# Patient Record
Sex: MALE | Race: WHITE | NOT HISPANIC OR LATINO | URBAN - METROPOLITAN AREA
[De-identification: names, ages, dates, MRNs, and addresses within clinical notes are randomized per-mention and may not be internally consistent; named-entity substitution may affect disease eponyms.]

---

## 2018-07-04 ENCOUNTER — EMERGENCY (EMERGENCY)
Facility: HOSPITAL | Age: 73
LOS: 0 days | Discharge: HOME | End: 2018-07-05
Attending: EMERGENCY MEDICINE | Admitting: EMERGENCY MEDICINE

## 2018-07-04 VITALS
TEMPERATURE: 97 F | HEART RATE: 88 BPM | RESPIRATION RATE: 22 BRPM | OXYGEN SATURATION: 93 % | WEIGHT: 179.9 LBS | HEIGHT: 66 IN

## 2018-07-04 DIAGNOSIS — I50.9 HEART FAILURE, UNSPECIFIED: ICD-10-CM

## 2018-07-04 DIAGNOSIS — Z79.02 LONG TERM (CURRENT) USE OF ANTITHROMBOTICS/ANTIPLATELETS: ICD-10-CM

## 2018-07-04 DIAGNOSIS — J44.9 CHRONIC OBSTRUCTIVE PULMONARY DISEASE, UNSPECIFIED: ICD-10-CM

## 2018-07-04 DIAGNOSIS — Z79.82 LONG TERM (CURRENT) USE OF ASPIRIN: ICD-10-CM

## 2018-07-04 DIAGNOSIS — R06.02 SHORTNESS OF BREATH: ICD-10-CM

## 2018-07-04 DIAGNOSIS — Z79.52 LONG TERM (CURRENT) USE OF SYSTEMIC STEROIDS: ICD-10-CM

## 2018-07-04 DIAGNOSIS — E78.00 PURE HYPERCHOLESTEROLEMIA, UNSPECIFIED: ICD-10-CM

## 2018-07-04 DIAGNOSIS — J42 UNSPECIFIED CHRONIC BRONCHITIS: ICD-10-CM

## 2018-07-04 DIAGNOSIS — Z79.899 OTHER LONG TERM (CURRENT) DRUG THERAPY: ICD-10-CM

## 2018-07-04 DIAGNOSIS — Z85.51 PERSONAL HISTORY OF MALIGNANT NEOPLASM OF BLADDER: ICD-10-CM

## 2018-07-04 DIAGNOSIS — R09.3 ABNORMAL SPUTUM: ICD-10-CM

## 2018-07-04 DIAGNOSIS — I11.0 HYPERTENSIVE HEART DISEASE WITH HEART FAILURE: ICD-10-CM

## 2018-07-04 LAB
BASE EXCESS BLDV CALC-SCNC: 5.4 MMOL/L — HIGH (ref -2–2)
BASOPHILS # BLD AUTO: 0.05 K/UL — SIGNIFICANT CHANGE UP (ref 0–0.2)
BASOPHILS NFR BLD AUTO: 0.3 % — SIGNIFICANT CHANGE UP (ref 0–1)
CA-I SERPL-SCNC: 1.24 MMOL/L — SIGNIFICANT CHANGE UP (ref 1.12–1.3)
EOSINOPHIL # BLD AUTO: 0.11 K/UL — SIGNIFICANT CHANGE UP (ref 0–0.7)
EOSINOPHIL NFR BLD AUTO: 0.6 % — SIGNIFICANT CHANGE UP (ref 0–8)
GAS PNL BLDV: 137 MMOL/L — SIGNIFICANT CHANGE UP (ref 136–145)
GAS PNL BLDV: SIGNIFICANT CHANGE UP
HCO3 BLDV-SCNC: 34 MMOL/L — HIGH (ref 22–29)
HCT VFR BLD CALC: 42.9 % — SIGNIFICANT CHANGE UP (ref 42–52)
HCT VFR BLDA CALC: 48.3 % — HIGH (ref 34–44)
HGB BLD CALC-MCNC: 15.8 G/DL — SIGNIFICANT CHANGE UP (ref 14–18)
HGB BLD-MCNC: 14.2 G/DL — SIGNIFICANT CHANGE UP (ref 14–18)
HOROWITZ INDEX BLDV+IHG-RTO: 21 — SIGNIFICANT CHANGE UP
IMM GRANULOCYTES NFR BLD AUTO: 1.7 % — HIGH (ref 0.1–0.3)
LACTATE BLDV-MCNC: 0.5 MMOL/L — SIGNIFICANT CHANGE UP (ref 0.5–1.6)
LACTATE SERPL-SCNC: 0.6 MMOL/L — SIGNIFICANT CHANGE UP (ref 0.5–2.2)
LYMPHOCYTES # BLD AUTO: 17.4 % — LOW (ref 20.5–51.1)
LYMPHOCYTES # BLD AUTO: 3.03 K/UL — SIGNIFICANT CHANGE UP (ref 1.2–3.4)
MCHC RBC-ENTMCNC: 31 PG — SIGNIFICANT CHANGE UP (ref 27–31)
MCHC RBC-ENTMCNC: 33.1 G/DL — SIGNIFICANT CHANGE UP (ref 32–37)
MCV RBC AUTO: 93.7 FL — SIGNIFICANT CHANGE UP (ref 80–94)
MONOCYTES # BLD AUTO: 1.16 K/UL — HIGH (ref 0.1–0.6)
MONOCYTES NFR BLD AUTO: 6.7 % — SIGNIFICANT CHANGE UP (ref 1.7–9.3)
NEUTROPHILS # BLD AUTO: 12.77 K/UL — HIGH (ref 1.4–6.5)
NEUTROPHILS NFR BLD AUTO: 73.3 % — SIGNIFICANT CHANGE UP (ref 42.2–75.2)
NRBC # BLD: 0 /100 WBCS — SIGNIFICANT CHANGE UP (ref 0–0)
PCO2 BLDV: 62 MMHG — HIGH (ref 41–51)
PH BLDV: 7.34 — SIGNIFICANT CHANGE UP (ref 7.26–7.43)
PLATELET # BLD AUTO: 241 K/UL — SIGNIFICANT CHANGE UP (ref 130–400)
PO2 BLDV: 18 MMHG — LOW (ref 20–40)
POTASSIUM BLDV-SCNC: 3.8 MMOL/L — SIGNIFICANT CHANGE UP (ref 3.3–5.6)
RBC # BLD: 4.58 M/UL — LOW (ref 4.7–6.1)
RBC # FLD: 13.2 % — SIGNIFICANT CHANGE UP (ref 11.5–14.5)
SAO2 % BLDV: 20 % — SIGNIFICANT CHANGE UP
WBC # BLD: 17.41 K/UL — HIGH (ref 4.8–10.8)
WBC # FLD AUTO: 17.41 K/UL — HIGH (ref 4.8–10.8)

## 2018-07-04 RX ORDER — TAMSULOSIN HYDROCHLORIDE 0.4 MG/1
1 CAPSULE ORAL
Qty: 0 | Refills: 0 | COMMUNITY

## 2018-07-04 RX ORDER — MAGNESIUM SULFATE 500 MG/ML
2 VIAL (ML) INJECTION ONCE
Qty: 0 | Refills: 0 | Status: COMPLETED | OUTPATIENT
Start: 2018-07-04 | End: 2018-07-04

## 2018-07-04 RX ORDER — IPRATROPIUM/ALBUTEROL SULFATE 18-103MCG
3 AEROSOL WITH ADAPTER (GRAM) INHALATION ONCE
Qty: 0 | Refills: 0 | Status: COMPLETED | OUTPATIENT
Start: 2018-07-04 | End: 2018-07-04

## 2018-07-04 RX ORDER — ASPIRIN/CALCIUM CARB/MAGNESIUM 324 MG
0 TABLET ORAL
Qty: 0 | Refills: 0 | COMMUNITY

## 2018-07-04 RX ORDER — LEVOTHYROXINE SODIUM 125 MCG
1 TABLET ORAL
Qty: 0 | Refills: 0 | COMMUNITY

## 2018-07-04 RX ORDER — DEXAMETHASONE 0.5 MG/5ML
10 ELIXIR ORAL ONCE
Qty: 0 | Refills: 0 | Status: COMPLETED | OUTPATIENT
Start: 2018-07-04 | End: 2018-07-04

## 2018-07-04 RX ORDER — AMIODARONE HYDROCHLORIDE 400 MG/1
0 TABLET ORAL
Qty: 0 | Refills: 0 | COMMUNITY

## 2018-07-04 RX ORDER — ATORVASTATIN CALCIUM 80 MG/1
1 TABLET, FILM COATED ORAL
Qty: 0 | Refills: 0 | COMMUNITY

## 2018-07-04 RX ORDER — POTASSIUM CHLORIDE 20 MEQ
0 PACKET (EA) ORAL
Qty: 0 | Refills: 0 | COMMUNITY

## 2018-07-04 RX ORDER — FUROSEMIDE 40 MG
1 TABLET ORAL
Qty: 0 | Refills: 0 | COMMUNITY

## 2018-07-04 RX ORDER — CLOPIDOGREL BISULFATE 75 MG/1
0 TABLET, FILM COATED ORAL
Qty: 0 | Refills: 0 | COMMUNITY

## 2018-07-04 RX ORDER — METOPROLOL TARTRATE 50 MG
0 TABLET ORAL
Qty: 0 | Refills: 0 | COMMUNITY

## 2018-07-04 RX ADMIN — Medication 50 GRAM(S): at 23:26

## 2018-07-04 RX ADMIN — Medication 3 MILLILITER(S): at 23:27

## 2018-07-04 RX ADMIN — Medication 10 MILLIGRAM(S): at 23:26

## 2018-07-04 RX ADMIN — Medication 3 MILLILITER(S): at 23:41

## 2018-07-04 NOTE — ED PROVIDER NOTE - OBJECTIVE STATEMENT
73 y m pmh copd on home o2, afib pw cough. Cough with increasing sputum production for the past few weeks. Feels he is unable to get all the phlegm out of his chest. No alleviating or exacerbating factors. Started on prednisone and augmentin by Dr. Jose with no relief. Denies fever, chills, n/v, cp, abd pain. 73 y m pmh copd on home o2, afib pw cough. Cough with increasing sputum production for the past few weeks. Feels he is unable to get all the phlegm out of his chest. No alleviating or exacerbating factors. Has been needing home o2 more and getting sob more easily. Started on prednisone and augmentin by Dr. Jose last week with no relief. Denies fever, chills, n/v, cp, abd pain.

## 2018-07-04 NOTE — ED PROVIDER NOTE - NS ED ROS FT
Eyes:  No visual changes, eye pain or discharge.  ENMT:  No hearing changes, pain, discharge or infections. No neck pain or stiffness.  Cardiac:  SOB. No chest pain or edema.   Respiratory:  No cough or respiratory distress. No hemoptysis. No history of asthma or RAD.  GI:  No nausea, vomiting, diarrhea or abdominal pain.  :  No dysuria, frequency or burning.  MS:  No myalgia, muscle weakness, joint pain or back pain.  Neuro:  No headache or weakness.  No LOC.  Skin:  No skin rash.   Endocrine: No history of thyroid disease or diabetes.

## 2018-07-04 NOTE — ED PROVIDER NOTE - ATTENDING CONTRIBUTION TO CARE
Pt is a 74yo male with hx of Afib and COPD on 2.5L home O2 who comes in for 2 weeks of afebrile cough with production of nonbloody whitish sputum.  He saw Dr. Jose and was started on Prednisone and Augmentin but reports symptoms have not abated.  No acute worsening of symptoms but reports that he is tired of feeling like he has chest congestion and is unable to get all the phlegm out.  No CP or syncope.  No other complaints.    Exam: b/l wheezes, speaking in full sentences, no LE edema, no calf tenderness soft nontender abdomen, cap refill <2s, MMM, normal pharynx, no LAD  Imp: chronic bronchitis  Plan: nebs, steroid, Mg, XR chest, EKG, labs

## 2018-07-04 NOTE — ED ADULT NURSE NOTE - PMH
Bladder cancer    Congestive heart failure    COPD (chronic obstructive pulmonary disease)    High blood cholesterol    Hypertension

## 2018-07-04 NOTE — ED PROVIDER NOTE - PROGRESS NOTE DETAILS
Pt says sob and cough improved after medications. Instructed pt to continue taking medications as prescribed by Dr. Jose and call him tomorrow morning to make an appointment. Pt says sob and cough improved after medications. Instructed pt to continue taking medications as prescribed by Dr. Jose and call him tomorrow morning to make an appointment. Pt in no acute respiratory distress. Speaking in full sentences with no accessory muscle use or increased work of breathing.

## 2018-07-04 NOTE — ED PROVIDER NOTE - PHYSICAL EXAMINATION
CONSTITUTIONAL: Well-developed; well-nourished; in no acute distress.   SKIN: warm, dry  HEAD: Normocephalic; atraumatic.  EYES: normal sclera and conjunctiva   ENT: No nasal discharge; airway clear.  NECK: Supple; non tender.  CARD: S1, S2 normal; no murmurs, gallops, or rubs. Regular rate and rhythm.   RESP: BL wheezing. No accessory muscle use, nasal flaring.   ABD: soft ntnd  EXT: Normal ROM.  No clubbing, cyanosis or edema. No posterior calf ttp.   LYMPH: No acute cervical adenopathy.  NEURO: Alert, oriented, grossly unremarkable  PSYCH: Cooperative, appropriate.

## 2018-07-05 VITALS
SYSTOLIC BLOOD PRESSURE: 150 MMHG | TEMPERATURE: 98 F | HEART RATE: 84 BPM | RESPIRATION RATE: 20 BRPM | DIASTOLIC BLOOD PRESSURE: 67 MMHG | OXYGEN SATURATION: 95 %

## 2018-07-05 LAB
ANION GAP SERPL CALC-SCNC: 9 MMOL/L — SIGNIFICANT CHANGE UP (ref 7–14)
BUN SERPL-MCNC: 37 MG/DL — HIGH (ref 10–20)
CALCIUM SERPL-MCNC: 9.4 MG/DL — SIGNIFICANT CHANGE UP (ref 8.5–10.1)
CHLORIDE SERPL-SCNC: 100 MMOL/L — SIGNIFICANT CHANGE UP (ref 98–110)
CO2 SERPL-SCNC: 31 MMOL/L — SIGNIFICANT CHANGE UP (ref 17–32)
CREAT SERPL-MCNC: 1.5 MG/DL — SIGNIFICANT CHANGE UP (ref 0.7–1.5)
GLUCOSE SERPL-MCNC: 108 MG/DL — HIGH (ref 70–99)
MAGNESIUM SERPL-MCNC: 1.9 MG/DL — SIGNIFICANT CHANGE UP (ref 1.8–2.4)
POTASSIUM SERPL-MCNC: 4.2 MMOL/L — SIGNIFICANT CHANGE UP (ref 3.5–5)
POTASSIUM SERPL-SCNC: 4.2 MMOL/L — SIGNIFICANT CHANGE UP (ref 3.5–5)
SODIUM SERPL-SCNC: 140 MMOL/L — SIGNIFICANT CHANGE UP (ref 135–146)
TROPONIN T SERPL-MCNC: <0.01 NG/ML — SIGNIFICANT CHANGE UP

## 2018-08-10 ENCOUNTER — EMERGENCY (EMERGENCY)
Facility: HOSPITAL | Age: 73
LOS: 0 days | Discharge: HOME | End: 2018-08-10
Attending: EMERGENCY MEDICINE | Admitting: EMERGENCY MEDICINE

## 2018-08-10 VITALS
SYSTOLIC BLOOD PRESSURE: 152 MMHG | RESPIRATION RATE: 18 BRPM | OXYGEN SATURATION: 96 % | HEIGHT: 66 IN | HEART RATE: 61 BPM | WEIGHT: 179.9 LBS | DIASTOLIC BLOOD PRESSURE: 84 MMHG

## 2018-08-10 DIAGNOSIS — R51 HEADACHE: ICD-10-CM

## 2018-08-10 DIAGNOSIS — I11.0 HYPERTENSIVE HEART DISEASE WITH HEART FAILURE: ICD-10-CM

## 2018-08-10 DIAGNOSIS — M62.838 OTHER MUSCLE SPASM: ICD-10-CM

## 2018-08-10 DIAGNOSIS — F17.200 NICOTINE DEPENDENCE, UNSPECIFIED, UNCOMPLICATED: ICD-10-CM

## 2018-08-10 DIAGNOSIS — Z79.82 LONG TERM (CURRENT) USE OF ASPIRIN: ICD-10-CM
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
## 2018-08-10 DIAGNOSIS — J44.9 CHRONIC OBSTRUCTIVE PULMONARY DISEASE, UNSPECIFIED: ICD-10-CM

## 2018-08-10 DIAGNOSIS — Z79.52 LONG TERM (CURRENT) USE OF SYSTEMIC STEROIDS: ICD-10-CM

## 2018-08-10 DIAGNOSIS — I50.9 HEART FAILURE, UNSPECIFIED: ICD-10-CM
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
## 2018-08-10 RX ORDER — ACETAMINOPHEN 500 MG
650 TABLET ORAL ONCE
Qty: 0 | Refills: 0 | Status: COMPLETED | OUTPATIENT
Start: 2018-08-10 | End: 2018-08-10

## 2018-08-10 RX ORDER — IBUPROFEN 200 MG
600 TABLET ORAL ONCE
Qty: 0 | Refills: 0 | Status: COMPLETED | OUTPATIENT
Start: 2018-08-10 | End: 2018-08-10

## 2018-08-10 RX ORDER — IPRATROPIUM/ALBUTEROL SULFATE 18-103MCG
3 AEROSOL WITH ADAPTER (GRAM) INHALATION ONCE
Qty: 0 | Refills: 0 | Status: COMPLETED | OUTPATIENT
Start: 2018-08-10 | End: 2018-08-10

## 2018-08-10 RX ADMIN — Medication 3 MILLILITER(S): at 20:22

## 2018-08-10 RX ADMIN — Medication 600 MILLIGRAM(S): at 20:46
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
## 2018-08-10 NOTE — ED ADULT NURSE NOTE - CHPI ED NUR SYMPTOMS NEG
no decreased eating/drinking/no weakness/no tingling/no nausea/no chills/no dizziness/no vomiting/no fever

## 2018-08-10 NOTE — ED ADULT NURSE NOTE - NSIMPLEMENTINTERV_GEN_ALL_ED
Implemented All Universal Safety Interventions:  [place] to call system. Call bell, personal items and telephone within reach. Instruct patient to call for assistance. Room bathroom lighting operational. Non-slip footwear when patient is off stretcher. Physically safe environment: no spills, clutter or unnecessary equipment. Stretcher in lowest position, wheels locked, appropriate side rails in place.

## 2018-08-11 PROBLEM — I50.9 HEART FAILURE, UNSPECIFIED: Chronic | Status: ACTIVE | Noted: 2018-07-04

## 2018-08-11 PROBLEM — C67.9 MALIGNANT NEOPLASM OF BLADDER, UNSPECIFIED: Chronic | Status: ACTIVE | Noted: 2018-07-04

## 2018-08-11 PROBLEM — J44.9 CHRONIC OBSTRUCTIVE PULMONARY DISEASE, UNSPECIFIED: Chronic | Status: ACTIVE | Noted: 2018-07-04

## 2018-08-11 PROBLEM — E78.00 PURE HYPERCHOLESTEROLEMIA, UNSPECIFIED: Chronic | Status: ACTIVE | Noted: 2018-07-04

## 2018-08-11 PROBLEM — I10 ESSENTIAL (PRIMARY) HYPERTENSION: Chronic | Status: ACTIVE | Noted: 2018-07-04

## 2018-08-11 NOTE — ED PROVIDER NOTE - PHYSICAL EXAMINATION
CONST: Well appearing in NAD  EYES: PERRL, EOMI, Sclera and conjunctiva clear.   ENT: No nasal discharge. TM's clear B/L without drainage. Oropharynx normal appearing, no erythema or exudates. Uvula midline.  NECK: Non-tender, no meningeal signs  CARD: Normal S1 S2; Normal rate and rhythm  RESP: Equal BS B/L, No wheezes, rhonchi or rales. No distress  GI: Soft, non-tender, non-distended.  MS: + tenderness to bilateral trapezius, pulses 2 +, Normal ROM in all extremities. No midline spinal tenderness.  SKIN: Warm, dry, no acute rashes. Good turgor  NEURO: A&Ox3, No focal deficits. Strength 5/5 with no sensory deficits. Steady gait

## 2018-08-11 NOTE — ED PROVIDER NOTE - MEDICAL DECISION MAKING DETAILS
I personally evaluated the patient. I reviewed the Resident’s or Physician Assistant’s note (as assigned above), and agree with the findings and plan except as documented in my note. I have fully discussed the medical management and delivery of care with the patient. I have discussed any available labs, imaging and treatment options with the patient. Patient confirms understanding and has been given detailed return precautions. Patient instructed to return to the ED should symptoms persist or worsen. Patient has demonstrated capacity and has verbalized understanding. Patient is well appearing upon discharge. Repeat neuro exam prior to discharge unremarkable.
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
## 2018-08-11 NOTE — ED PROVIDER NOTE - NS ED ROS FT
Review of Systems:  	•	CONSTITUTIONAL - no fever, no diaphoresis, no chills  	•	SKIN - no rash  	•	EYES - no eye pain, no blurry vision  	•	ENT - no change in hearing, no sore throat, no ear pain or tinnitus  	•	RESPIRATORY - no shortness of breath, no cough  	•	CARDIAC - no chest pain, no palpitations  	•	GI - no abd pain, no nausea, no vomiting, no diarrhea, no constipation  	•	GENITO-URINARY - no discharge, no dysuria; no hematuria, no increased urinary frequency  	•	MUSCULOSKELETAL - no joint paint, no swelling, no redness  	•	NEUROLOGIC - HA

## 2021-03-15 ENCOUNTER — NON-APPOINTMENT (OUTPATIENT)
Age: 76
End: 2021-03-15

## 2021-03-15 DIAGNOSIS — Z87.891 PERSONAL HISTORY OF NICOTINE DEPENDENCE: ICD-10-CM

## 2021-03-15 RX ORDER — TIOTROPIUM BROMIDE 18 UG/1
CAPSULE ORAL; RESPIRATORY (INHALATION)
Refills: 0 | Status: ACTIVE | COMMUNITY

## 2021-03-15 RX ORDER — ASPIRIN 81 MG
81 TABLET, DELAYED RELEASE (ENTERIC COATED) ORAL
Refills: 0 | Status: ACTIVE | COMMUNITY

## 2021-03-15 RX ORDER — ALBUTEROL SULFATE 90 UG/1
108 (90 BASE) AEROSOL, METERED RESPIRATORY (INHALATION)
Refills: 0 | Status: ACTIVE | COMMUNITY

## 2021-03-25 ENCOUNTER — APPOINTMENT (OUTPATIENT)
Dept: PULMONOLOGY | Facility: CLINIC | Age: 76
End: 2021-03-25
Payer: MEDICARE

## 2021-03-25 VITALS
HEIGHT: 66 IN | HEART RATE: 62 BPM | DIASTOLIC BLOOD PRESSURE: 70 MMHG | SYSTOLIC BLOOD PRESSURE: 130 MMHG | RESPIRATION RATE: 12 BRPM | BODY MASS INDEX: 28.28 KG/M2 | WEIGHT: 176 LBS

## 2021-03-25 PROCEDURE — 99072 ADDL SUPL MATRL&STAF TM PHE: CPT

## 2021-03-25 PROCEDURE — 99213 OFFICE O/P EST LOW 20 MIN: CPT

## 2021-03-25 PROCEDURE — 71046 X-RAY EXAM CHEST 2 VIEWS: CPT

## 2021-03-25 PROCEDURE — 99406 BEHAV CHNG SMOKING 3-10 MIN: CPT

## 2021-03-25 NOTE — COUNSELING
[Risk of tobacco use and health benefits of smoking cessation discussed] : Risk of tobacco use and health benefits of smoking cessation discussed [Encouraged to pick a quit date and identify support needed to quit] : Encouraged to pick a quit date and identify support needed to quit [Tobacco Use Cessation Intermediate Greater Than 3 Minutes Up to 10 Minutes] : Tobacco Use Cessation Intermediate Greater Than 3 Minutes Up to 10 Minutes [FreeTextEntry1] : 5

## 2021-03-25 NOTE — PHYSICAL EXAM
[No Acute Distress] : no acute distress [Normal Oropharynx] : normal oropharynx [Normal Appearance] : normal appearance [No Neck Mass] : no neck mass [Normal Rate/Rhythm] : normal rate/rhythm [Normal S1, S2] : normal s1, s2 [No Murmurs] : no murmurs [No Resp Distress] : no resp distress [No Abnormalities] : no abnormalities [Benign] : benign [Normal Gait] : normal gait [No Clubbing] : no clubbing [No Cyanosis] : no cyanosis [No Edema] : no edema [FROM] : FROM [Normal Color/ Pigmentation] : normal color/ pigmentation [No Focal Deficits] : no focal deficits [Oriented x3] : oriented x3 [Normal Affect] : normal affect [TextBox_68] : decreased scattered ronchi

## 2021-03-25 NOTE — HISTORY OF PRESENT ILLNESS
[Stable] : stable [Difficulty Breathing During Exertion] : stable dyspnea on exertion [Feelings Of Weakness On Exertion] : stable exercise intolerance [Cough] : stable coughing [Coughing Up Sputum] : denies coughing up sputum [Wheezing] : denies wheezing [Regional Soft Tissue Swelling Both Lower Extremities] : denies lower extremity edema [Fever] : no fever [More Frequent Use Needed Recently] : more frequent [Adherent] : the patient is adherent with ~his/her~ medication regimen [FreeTextEntry9] : started smoking again. Due for CT but he is refusing Daughter present and aware. [de-identified] : taking Advair PRN

## 2021-03-25 NOTE — ASSESSMENT
[FreeTextEntry1] : Stress compliance with inhalers\par PRN albuterol\par ICS/LABA BID stressed importance\par needs CT but refusing\par F/U 6 months\par

## 2021-06-01 NOTE — ED ADULT NURSE NOTE - TOBACCO USE
----- Message from Ramesh Mason MD sent at 5/31/2021  8:59 PM CDT -----  Overall stable with mild increase in thickness observe   
Will discuss in clinic visit tomorrow.  
Current some day smoker

## 2021-09-24 ENCOUNTER — APPOINTMENT (OUTPATIENT)
Dept: CARDIOLOGY | Facility: CLINIC | Age: 76
End: 2021-09-24
Payer: MEDICARE

## 2021-09-24 VITALS
SYSTOLIC BLOOD PRESSURE: 126 MMHG | WEIGHT: 169 LBS | BODY MASS INDEX: 27.16 KG/M2 | HEIGHT: 66 IN | DIASTOLIC BLOOD PRESSURE: 68 MMHG

## 2021-09-24 PROCEDURE — 99214 OFFICE O/P EST MOD 30 MIN: CPT

## 2021-09-24 RX ORDER — FLUTICASONE FUROATE AND VILANTEROL TRIFENATATE 200; 25 UG/1; UG/1
200-25 POWDER RESPIRATORY (INHALATION)
Refills: 0 | Status: DISCONTINUED | COMMUNITY
End: 2021-09-24

## 2021-09-24 NOTE — DISCUSSION/SUMMARY
[FreeTextEntry1] : Patient lost 7 pond.  Bladder cancer s. Eating less. He sees Dr Jose,He use o2 24 hours, On amiodarone five days /week. week.  He has bladder ca, He sees  Franck. He got chemo at [facility] He had afib converted with amiodarone Now on po amiodarone. CXR by Dr Jose. Cysto every 6 mos. Opth patiet to see.Last dobutamine  se 4/18 neg. He had shoulder pain. But it is not cardiac. To try not Vape

## 2021-09-24 NOTE — REVIEW OF SYSTEMS
[Fever] : no fever [Chills] : no chills [Blurry Vision] : no blurred vision [Hearing Loss] : hearing loss [Dyspnea on exertion] : dyspnea during exertion [Cough] : cough [Wheezing] : wheezing [Abdominal Pain] : no abdominal pain [Joint Pain] : no joint pain [Rash] : no rash [Dizziness] : no dizziness [Confusion] : no confusion was observed [Easy Bleeding] : no tendency for easy bleeding

## 2021-09-24 NOTE — HISTORY OF PRESENT ILLNESS
[FreeTextEntry1] : The patient use o2 at home. He is sob. No change. He gets THAPA. One episode sharp pain l arm. No exertional pain It was like electric shock . He still smokes

## 2021-10-02 ENCOUNTER — EMERGENCY (EMERGENCY)
Facility: HOSPITAL | Age: 76
LOS: 0 days | Discharge: HOME | End: 2021-10-02
Attending: EMERGENCY MEDICINE | Admitting: EMERGENCY MEDICINE
Payer: MEDICARE

## 2021-10-02 VITALS
HEART RATE: 50 BPM | SYSTOLIC BLOOD PRESSURE: 141 MMHG | RESPIRATION RATE: 18 BRPM | OXYGEN SATURATION: 96 % | TEMPERATURE: 98 F | DIASTOLIC BLOOD PRESSURE: 72 MMHG

## 2021-10-02 VITALS
TEMPERATURE: 98 F | HEIGHT: 66 IN | RESPIRATION RATE: 18 BRPM | DIASTOLIC BLOOD PRESSURE: 56 MMHG | WEIGHT: 171.96 LBS | OXYGEN SATURATION: 99 % | HEART RATE: 54 BPM | SYSTOLIC BLOOD PRESSURE: 117 MMHG

## 2021-10-02 DIAGNOSIS — S01.20XA UNSPECIFIED OPEN WOUND OF NOSE, INITIAL ENCOUNTER: ICD-10-CM

## 2021-10-02 DIAGNOSIS — J44.9 CHRONIC OBSTRUCTIVE PULMONARY DISEASE, UNSPECIFIED: ICD-10-CM

## 2021-10-02 DIAGNOSIS — I50.9 HEART FAILURE, UNSPECIFIED: ICD-10-CM
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
## 2021-10-02 DIAGNOSIS — I11.0 HYPERTENSIVE HEART DISEASE WITH HEART FAILURE: ICD-10-CM

## 2021-10-02 DIAGNOSIS — E78.5 HYPERLIPIDEMIA, UNSPECIFIED: ICD-10-CM

## 2021-10-02 DIAGNOSIS — Z79.82 LONG TERM (CURRENT) USE OF ASPIRIN: ICD-10-CM

## 2021-10-02 DIAGNOSIS — R04.0 EPISTAXIS: ICD-10-CM
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
## 2021-10-02 DIAGNOSIS — I25.10 ATHEROSCLEROTIC HEART DISEASE OF NATIVE CORONARY ARTERY WITHOUT ANGINA PECTORIS: ICD-10-CM

## 2021-10-02 DIAGNOSIS — L76.22 POSTPROCEDURAL HEMORRHAGE OF SKIN AND SUBCUTANEOUS TISSUE FOLLOWING OTHER PROCEDURE: ICD-10-CM

## 2021-10-02 DIAGNOSIS — E78.00 PURE HYPERCHOLESTEROLEMIA, UNSPECIFIED: ICD-10-CM

## 2021-10-02 DIAGNOSIS — Z85.51 PERSONAL HISTORY OF MALIGNANT NEOPLASM OF BLADDER: ICD-10-CM

## 2021-10-02 PROCEDURE — 99284 EMERGENCY DEPT VISIT MOD MDM: CPT | Mod: 25

## 2021-10-02 PROCEDURE — 30901 CONTROL OF NOSEBLEED: CPT

## 2021-10-02 NOTE — ED PROVIDER NOTE - NSICDXPASTMEDICALHX_GEN_ALL_CORE_FT
PAST MEDICAL HISTORY:  Bladder cancer     Congestive heart failure     COPD (chronic obstructive pulmonary disease)     High blood cholesterol     Hypertension

## 2021-10-02 NOTE — ED PROVIDER NOTE - CLINICAL SUMMARY MEDICAL DECISION MAKING FREE TEXT BOX
Pt with bleeding from wound that required cautery.  Pt observed in ED.  no further bleeding.  Will f/u with his Derm. Pt instructed to return if any worsening symptoms or concerns.  They verbalize understanding.

## 2021-10-02 NOTE — ED PROVIDER NOTE - PHYSICAL EXAMINATION
Physical Exam    Vital Signs: I have reviewed the initial vital signs.  Constitutional: well-nourished, appears stated age, no acute distress  Eyes: Conjunctiva pink, Sclera clear,  ENT: from bx site, bleeding from numerous small vessels.   Cardiovascular: S1 and S2, regular rate, regular rhythm, well-perfused extremities, radial pulses equal and 2+  Respiratory: unlabored respiratory effort, clear to auscultation bilaterally no wheezing, rales and rhonchi  Gastrointestinal: soft, non-tender abdomen, no pulsatile mass, normal bowl sounds  Musculoskeletal: supple neck, no lower extremity edema, no midline tenderness  Integumentary: warm, dry, no rash  Neurologic: awake, alert, nvi

## 2021-10-02 NOTE — ED PROVIDER NOTE - OBJECTIVE STATEMENT
75 yo male, pmh of htn, hld, cad on asa and plavix, copd, presents to ed for bleeding from nose s/p bx several days ago, no pain or radiation, started today pta. denies falls.

## 2021-10-02 NOTE — ED PROVIDER NOTE - ATTENDING CONTRIBUTION TO CARE
77 yo M presents with bleeding from nose at biopsy site.  Pt had biopsy with Derm few days ago.  States bleeding started when he was cleaning his nose. no trauma, + biopsy site with bleeding from inf edge, no swelling, no pus

## 2021-10-02 NOTE — ED PROVIDER NOTE - NS ED ROS FT

## 2021-10-02 NOTE — ED PROVIDER NOTE - PATIENT PORTAL LINK FT
You can access the FollowMyHealth Patient Portal offered by [facility] by registering at the following website: [email or web address]. By joining [employer]’s FollowMyHealth portal, you will also be able to view your health information using other applications (apps) compatible with our system.

## 2021-10-02 NOTE — ED ADULT NURSE NOTE - NSFALLRSKASSESSTYPE_ED_ALL_ED
fall precautions Initial (On Arrival) isolation precautions/fall precautions/Airborne/contact; (+) Covid

## 2021-10-02 NOTE — ED ADULT TRIAGE NOTE - CHIEF COMPLAINT QUOTE
pt has biopsy on nose, today was cleaning would and it began to bleed excessively. bleeding stopped on arrival to ED, pt takes plavix and baby aspirin

## 2021-11-04 ENCOUNTER — APPOINTMENT (OUTPATIENT)
Age: 76
End: 2021-11-04

## 2021-11-29 ENCOUNTER — LABORATORY RESULT (OUTPATIENT)
Age: 76
End: 2021-11-29

## 2021-11-30 ENCOUNTER — NON-APPOINTMENT (OUTPATIENT)
Age: 76
End: 2021-11-30

## 2021-11-30 ENCOUNTER — APPOINTMENT (OUTPATIENT)
Dept: CARDIOLOGY | Facility: CLINIC | Age: 76
End: 2021-11-30
Payer: MEDICARE

## 2021-11-30 VITALS
HEIGHT: 66 IN | DIASTOLIC BLOOD PRESSURE: 70 MMHG | BODY MASS INDEX: 27.64 KG/M2 | WEIGHT: 172 LBS | SYSTOLIC BLOOD PRESSURE: 124 MMHG

## 2021-11-30 PROCEDURE — 99214 OFFICE O/P EST MOD 30 MIN: CPT

## 2021-11-30 NOTE — HISTORY OF PRESENT ILLNESS
[FreeTextEntry1] : The patient use o2 at home. He is sob. He gets THAPA. Possibly worse.  He still smokes. He has pvd. But limited by THAPA

## 2021-11-30 NOTE — PHYSICAL EXAM
[Well Developed] : well developed [Well Nourished] : well nourished [No Acute Distress] : no acute distress [Normal Conjunctiva] : normal conjunctiva [Normal Venous Pressure] : normal venous pressure [Normal S1, S2] : normal S1, S2 [No Murmur] : no murmur [Normal Rate] : the respiratory rate was normal [Decreased Breath Sounds] : breath sounds were decreased diffusely [Decreased Breath Sounds] : breath sounds were not diminished [Normal Gait] : normal gait [No Edema] : no edema [Alert and Oriented] : alert and oriented [de-identified] : varicose veins legs

## 2021-11-30 NOTE — DISCUSSION/SUMMARY
[FreeTextEntry1] : Patient gained 3  ponds.  Bladder cancer . He sees Dr Jose,He is suppose  use o2 24 hours, He does not always use.  On amiodarone five days /week. week.  He has bladder ca, He sees  Franck.Now see Dr Hayes.  He got chemo at [facility] He had afib converted with amiodarone Now on po amiodarone. CXR by Dr Jose. Cysto every 6 mos. Opth patiet to see.Last dobutamine  se 4/18 neg. He had shoulder pain. But it is not cardiac. Told stop smoking . will check blood

## 2021-12-01 LAB
25(OH)D3 SERPL-MCNC: 24 NG/ML
BASOPHILS # BLD AUTO: 0.1 K/UL
BASOPHILS NFR BLD AUTO: 0.9 %
CHOLEST SERPL-MCNC: 177 MG/DL
EOSINOPHIL # BLD AUTO: 0.42 K/UL
EOSINOPHIL NFR BLD AUTO: 3.8 %
HCT VFR BLD CALC: 47.8 %
HDLC SERPL-MCNC: 51 MG/DL
HGB BLD-MCNC: 15.1 G/DL
IMM GRANULOCYTES NFR BLD AUTO: 0.4 %
LDLC SERPL CALC-MCNC: 93 MG/DL
LYMPHOCYTES # BLD AUTO: 3.45 K/UL
LYMPHOCYTES NFR BLD AUTO: 31.6 %
MAN DIFF?: NORMAL
MCHC RBC-ENTMCNC: 29.7 PG
MCHC RBC-ENTMCNC: 31.6 G/DL
MCV RBC AUTO: 93.9 FL
MONOCYTES # BLD AUTO: 1.13 K/UL
MONOCYTES NFR BLD AUTO: 10.4 %
NEUTROPHILS # BLD AUTO: 5.77 K/UL
NEUTROPHILS NFR BLD AUTO: 52.9 %
NONHDLC SERPL-MCNC: 126 MG/DL
PLATELET # BLD AUTO: 222 K/UL
RBC # BLD: 5.09 M/UL
RBC # FLD: 13.8 %
T4 SERPL-MCNC: 5.8 UG/DL
TRIGL SERPL-MCNC: 189 MG/DL
TSH SERPL-ACNC: 11.41 UIU/ML
WBC # FLD AUTO: 10.91 K/UL

## 2021-12-01 RX ORDER — POTASSIUM CHLORIDE 750 MG/1
10 TABLET, EXTENDED RELEASE ORAL 3 TIMES DAILY
Qty: 270 | Refills: 3 | Status: DISCONTINUED | COMMUNITY
Start: 1900-01-01 | End: 2021-12-01

## 2021-12-01 RX ORDER — LEVOTHYROXINE SODIUM 100 UG/1
100 TABLET ORAL DAILY
Qty: 30 | Refills: 5 | Status: DISCONTINUED | COMMUNITY
Start: 2021-07-03 | End: 2021-12-01

## 2021-12-02 LAB
ALBUMIN SERPL ELPH-MCNC: 4.4 G/DL
ALP BLD-CCNC: 104 U/L
ALT SERPL-CCNC: 14 U/L
ANION GAP SERPL CALC-SCNC: 17 MMOL/L
AST SERPL-CCNC: 19 U/L
BILIRUB SERPL-MCNC: 0.8 MG/DL
BUN SERPL-MCNC: 26 MG/DL
CALCIUM SERPL-MCNC: 9.5 MG/DL
CHLORIDE SERPL-SCNC: 99 MMOL/L
CO2 SERPL-SCNC: 25 MMOL/L
CREAT SERPL-MCNC: 1.3 MG/DL
GLUCOSE SERPL-MCNC: 97 MG/DL
POTASSIUM SERPL-SCNC: 4.1 MMOL/L
PROT SERPL-MCNC: 7.5 G/DL
SODIUM SERPL-SCNC: 141 MMOL/L

## 2021-12-12 NOTE — ED ADULT NURSE NOTE - CINV DISCH TEACH PARTICIP
Thang Patel)  Orthopaedic Surgery  [address] [place] [address]  Phone: [phone]  Fax: [phone]  Follow Up Time: 1-3 Days   Patient

## 2021-12-13 RX ORDER — METOPROLOL SUCCINATE 25 MG/1
25 TABLET, EXTENDED RELEASE ORAL DAILY
Qty: 90 | Refills: 3 | Status: DISCONTINUED | COMMUNITY
Start: 1900-01-01 | End: 2021-12-13

## 2021-12-30 ENCOUNTER — APPOINTMENT (OUTPATIENT)
Dept: CARDIOLOGY | Facility: CLINIC | Age: 76
End: 2021-12-30
Payer: MEDICARE

## 2021-12-30 ENCOUNTER — APPOINTMENT (OUTPATIENT)
Dept: CARDIOLOGY | Facility: CLINIC | Age: 76
End: 2021-12-30

## 2021-12-30 PROCEDURE — 93000 ELECTROCARDIOGRAM COMPLETE: CPT

## 2022-01-06 ENCOUNTER — APPOINTMENT (OUTPATIENT)
Age: 77
End: 2022-01-06

## 2022-01-13 ENCOUNTER — APPOINTMENT (OUTPATIENT)
Age: 77
End: 2022-01-13

## 2022-03-24 ENCOUNTER — APPOINTMENT (OUTPATIENT)
Dept: CARDIOLOGY | Facility: CLINIC | Age: 77
End: 2022-03-24
Payer: MEDICARE

## 2022-03-24 VITALS
SYSTOLIC BLOOD PRESSURE: 120 MMHG | HEIGHT: 66 IN | BODY MASS INDEX: 26.52 KG/M2 | DIASTOLIC BLOOD PRESSURE: 70 MMHG | WEIGHT: 165 LBS

## 2022-03-24 PROCEDURE — 99214 OFFICE O/P EST MOD 30 MIN: CPT

## 2022-03-24 NOTE — DISCUSSION/SUMMARY
[FreeTextEntry1] : Patient gained 3  ponds.  Bladder cancer . He sees Dr Jose,He is suppose  use o2 24 hours, He does not always use.  On amiodarone five days /week. week.  He has bladder ca, He sees  Franck.Now see Dr Hayes.  He got chemo at [facility] He had afib converted with amiodarone Now on po amiodarone. CXR by Dr Jose. Cysto every 6 mos. Opth patiet to see.Last dobutamine  se 4/18 neg. He had shoulder pain. But it is not cardiac. Reviewed stop smoking . He had covid 12/21. Check blood thyroid vit d possibly next visit

## 2022-03-24 NOTE — PHYSICAL EXAM
[Well Developed] : well developed [Well Nourished] : well nourished [No Acute Distress] : no acute distress [Normal Conjunctiva] : normal conjunctiva [Normal Venous Pressure] : normal venous pressure [Normal S1, S2] : normal S1, S2 [No Murmur] : no murmur [Normal Rate] : the respiratory rate was normal [Decreased Breath Sounds] : breath sounds were decreased diffusely [Decreased Breath Sounds] : breath sounds were not diminished [Normal Gait] : normal gait [No Edema] : no edema [Alert and Oriented] : alert and oriented [de-identified] : varicose veins legs

## 2022-03-24 NOTE — HISTORY OF PRESENT ILLNESS
[FreeTextEntry1] : The patient use o2 at home. He is sob. He gets THAPA. stable. More anxious since covid.    He still smokes. He has pvd. But limited by THAPA. He had covid 12/21. Occosional palpitations

## 2022-03-25 ENCOUNTER — RX RENEWAL (OUTPATIENT)
Age: 77
End: 2022-03-25

## 2022-04-28 DIAGNOSIS — Z23 ENCOUNTER FOR IMMUNIZATION: ICD-10-CM

## 2022-07-07 ENCOUNTER — APPOINTMENT (OUTPATIENT)
Dept: CARDIOLOGY | Facility: CLINIC | Age: 77
End: 2022-07-07

## 2022-08-10 ENCOUNTER — APPOINTMENT (OUTPATIENT)
Age: 77
End: 2022-08-10

## 2022-08-10 VITALS
RESPIRATION RATE: 14 BRPM | OXYGEN SATURATION: 92 % | WEIGHT: 158 LBS | HEART RATE: 49 BPM | HEIGHT: 66 IN | BODY MASS INDEX: 25.39 KG/M2

## 2022-08-10 PROCEDURE — 99214 OFFICE O/P EST MOD 30 MIN: CPT

## 2022-08-11 NOTE — PROCEDURE
[FreeTextEntry1] : PA and LAT CXR Report\par \par PA and LAT CXR was ordered to evaluate for causes of dyspnea.\par  \par The films demonstrates:\par The heart and mediastinal structures are normal\par There are no increased interstitial markings.\par There are no infiltrates present\par There nodules present in RLL \par \par \par Impression:\par new nodules RLL\par

## 2022-08-11 NOTE — ASSESSMENT
[FreeTextEntry1] : Assessment:\par COPD  \par new nodules poss due to recent COVID\par \par plan:\par Stress compliance with inhalers. Renewed today.\par cont PRN albuterol\par cont ICS/LABA\par CT chest now\par \par F/U 6 months\par I reviewed the entire case with the patient's daughter who was in attendance today.\par \par

## 2022-08-11 NOTE — PHYSICAL EXAM
[No Acute Distress] : no acute distress [Normal Oropharynx] : normal oropharynx [Normal Appearance] : normal appearance [No Neck Mass] : no neck mass [Normal Rate/Rhythm] : normal rate/rhythm [Normal S1, S2] : normal s1, s2 [No Murmurs] : no murmurs [No Resp Distress] : no resp distress [No Abnormalities] : no abnormalities [Benign] : benign [Normal Gait] : normal gait [No Clubbing] : no clubbing [No Cyanosis] : no cyanosis [No Edema] : no edema [FROM] : FROM [Normal Color/ Pigmentation] : normal color/ pigmentation [No Focal Deficits] : no focal deficits [Oriented x3] : oriented x3 [Normal Affect] : normal affect [TextBox_68] : decreased BS

## 2022-09-08 ENCOUNTER — RX RENEWAL (OUTPATIENT)
Age: 77
End: 2022-09-08

## 2022-09-21 ENCOUNTER — NON-APPOINTMENT (OUTPATIENT)
Age: 77
End: 2022-09-21

## 2022-10-12 ENCOUNTER — RX RENEWAL (OUTPATIENT)
Age: 77
End: 2022-10-12

## 2022-11-07 ENCOUNTER — RX RENEWAL (OUTPATIENT)
Age: 77
End: 2022-11-07

## 2022-11-25 ENCOUNTER — RX RENEWAL (OUTPATIENT)
Age: 77
End: 2022-11-25

## 2022-12-02 ENCOUNTER — RX RENEWAL (OUTPATIENT)
Age: 77
End: 2022-12-02

## 2022-12-22 ENCOUNTER — RX RENEWAL (OUTPATIENT)
Age: 77
End: 2022-12-22

## 2022-12-22 RX ORDER — AMIODARONE HYDROCHLORIDE 200 MG/1
200 TABLET ORAL DAILY
Qty: 90 | Refills: 3 | Status: ACTIVE | COMMUNITY
Start: 2021-07-19 | End: 1900-01-01

## 2023-01-31 ENCOUNTER — RX RENEWAL (OUTPATIENT)
Age: 78
End: 2023-01-31

## 2023-01-31 RX ORDER — ALBUTEROL SULFATE 2.5 MG/3ML
(2.5 MG/3ML) SOLUTION RESPIRATORY (INHALATION)
Qty: 600 | Refills: 3 | Status: ACTIVE | COMMUNITY
Start: 2022-01-25 | End: 1900-01-01

## 2023-03-21 ENCOUNTER — APPOINTMENT (OUTPATIENT)
Dept: CARDIOLOGY | Facility: CLINIC | Age: 78
End: 2023-03-21
Payer: MEDICARE

## 2023-03-21 VITALS
DIASTOLIC BLOOD PRESSURE: 60 MMHG | BODY MASS INDEX: 25.23 KG/M2 | HEIGHT: 66 IN | WEIGHT: 157 LBS | SYSTOLIC BLOOD PRESSURE: 120 MMHG

## 2023-03-21 PROCEDURE — 99214 OFFICE O/P EST MOD 30 MIN: CPT | Mod: 25

## 2023-03-21 PROCEDURE — 36415 COLL VENOUS BLD VENIPUNCTURE: CPT

## 2023-03-21 NOTE — HISTORY OF PRESENT ILLNESS
[FreeTextEntry1] : The patient use o2 at home. He is sob. He gets THAPA. worse. Still at times smokes.  He has pvd. But limited by THAPA. He had covid 12/21. Rare   palpitations

## 2023-03-21 NOTE — DISCUSSION/SUMMARY
[FreeTextEntry1] : Patient lost 1 llb.  Bladder cancer . He sees Dr Jose,He is suppose  use o2 24 hours, He does not always use.  On amiodarone five days /week. week.  He has bladder ca, .Now see Dr Hayes.  He got chemo at [facility] He had afib converted with amiodarone Now on po amiodarone. CXR by Dr Jose. Cysto every 6 mos. Opth told again see.. .Last dobutamine  se 4/18 neg. He had shoulder pain. But it is not cardiac. Reviewed stop smoking . He had covid 12/21. Sergei check blood and ekg. Told need stop smoking. Will check blood ekg

## 2023-03-21 NOTE — PHYSICAL EXAM
[Well Developed] : well developed [Well Nourished] : well nourished [No Acute Distress] : no acute distress [Normal Conjunctiva] : normal conjunctiva [Normal Venous Pressure] : normal venous pressure [Normal S1, S2] : normal S1, S2 [No Murmur] : no murmur [Normal Rate] : the respiratory rate was normal [Decreased Breath Sounds] : breath sounds were decreased diffusely [Normal Gait] : normal gait [Decreased Breath Sounds] : breath sounds were not diminished [No Edema] : no edema [Alert and Oriented] : alert and oriented [de-identified] : varicose veins legs

## 2023-03-22 LAB
ALBUMIN SERPL ELPH-MCNC: 4 G/DL
ALP BLD-CCNC: 95 U/L
ALT SERPL-CCNC: 11 U/L
ANION GAP SERPL CALC-SCNC: 15 MMOL/L
AST SERPL-CCNC: 14 U/L
BASOPHILS # BLD AUTO: 0.08 K/UL
BASOPHILS NFR BLD AUTO: 0.9 %
BILIRUB SERPL-MCNC: 0.5 MG/DL
BUN SERPL-MCNC: 23 MG/DL
CALCIUM SERPL-MCNC: 9.3 MG/DL
CHLORIDE SERPL-SCNC: 100 MMOL/L
CO2 SERPL-SCNC: 24 MMOL/L
COVID-19 NUCLEOCAPSID  GAM ANTIBODY INTERPRETATION: POSITIVE
CREAT SERPL-MCNC: 1.2 MG/DL
EGFR: 62 ML/MIN/1.73M2
EOSINOPHIL # BLD AUTO: 0.27 K/UL
EOSINOPHIL NFR BLD AUTO: 3.1 %
GLUCOSE SERPL-MCNC: 123 MG/DL
HCT VFR BLD CALC: 30.8 %
HGB BLD-MCNC: 8.4 G/DL
IMM GRANULOCYTES NFR BLD AUTO: 0.2 %
LYMPHOCYTES # BLD AUTO: 2.17 K/UL
LYMPHOCYTES NFR BLD AUTO: 25.2 %
MAN DIFF?: NORMAL
MCHC RBC-ENTMCNC: 22.9 PG
MCHC RBC-ENTMCNC: 27.3 G/DL
MCV RBC AUTO: 83.9 FL
MONOCYTES # BLD AUTO: 0.87 K/UL
MONOCYTES NFR BLD AUTO: 10.1 %
NEUTROPHILS # BLD AUTO: 5.19 K/UL
NEUTROPHILS NFR BLD AUTO: 60.5 %
NT-PROBNP SERPL-MCNC: 462 PG/ML
PLATELET # BLD AUTO: 313 K/UL
POTASSIUM SERPL-SCNC: 4.1 MMOL/L
PROT SERPL-MCNC: 7.2 G/DL
RBC # BLD: 3.67 M/UL
RBC # FLD: 17.2 %
SARS-COV-2 AB SERPL QL IA: 15.5 INDEX
SODIUM SERPL-SCNC: 139 MMOL/L
WBC # FLD AUTO: 8.6 K/UL

## 2023-04-03 ENCOUNTER — APPOINTMENT (OUTPATIENT)
Dept: PULMONOLOGY | Facility: CLINIC | Age: 78
End: 2023-04-03
Payer: MEDICARE

## 2023-04-03 VITALS
BODY MASS INDEX: 25.13 KG/M2 | WEIGHT: 156.38 LBS | RESPIRATION RATE: 14 BRPM | OXYGEN SATURATION: 93 % | DIASTOLIC BLOOD PRESSURE: 80 MMHG | HEIGHT: 66 IN | HEART RATE: 61 BPM | SYSTOLIC BLOOD PRESSURE: 120 MMHG

## 2023-04-03 PROCEDURE — 99214 OFFICE O/P EST MOD 30 MIN: CPT | Mod: 25

## 2023-04-03 PROCEDURE — 71046 X-RAY EXAM CHEST 2 VIEWS: CPT

## 2023-04-03 NOTE — PROCEDURE
[FreeTextEntry1] : PA and LAT CXR Report\par \par PA and LAT CXR was ordered to evaluate for causes of dyspnea.\par  \par The films demonstrates:\par The heart and mediastinal structures are normal\par There are no increased interstitial markings.\par There are no infiltrates present\par There are no nodules or  masses present \par There is flattening of the hemidiaphragms and hyperlucency of the lung parenchyma\par \par \par Impression:\par Consistent with severe COPD\par

## 2023-04-03 NOTE — REASON FOR VISIT
[Follow-Up] : a follow-up visit [COPD] : COPD [TextBox_44] : slowly declining more SOB. Having anemia he is undergoing a work-up.  His daughter states his last hemoglobin was 8.  He does look pale.  The patient states he feels absolutely fine and that he is able to get around and do what ever he needs.\par \par His daughter states that he looks more short of breath when he does any activity.  He has oxygen at home as he desaturates during exercise but does not like to use it.  We had a long discussion that he would do better if he actually did any exercise while on oxygen.  He is taking his nebulizer.  He is using this several times per day.  He does take the Advair.

## 2023-04-03 NOTE — ASSESSMENT
[FreeTextEntry1] : Assessment:\par COPD gradually worsening as he ages.\par Anemia\par Hypoxia\par Still smoking.\par \par plan:\par Stress compliance with inhalers. Renewed today.\par cont PRN albuterol\par cont ICS/LABA\par CT chest is due in early fall.  He will then be 78 at that time\par D/C smoking was discussed with the patient\par F/U 6 months\par

## 2023-04-07 LAB
BASOPHILS # BLD AUTO: 0.1 K/UL
BASOPHILS NFR BLD AUTO: 1.1 %
EOSINOPHIL # BLD AUTO: 0.37 K/UL
EOSINOPHIL NFR BLD AUTO: 3.9 %
HCT VFR BLD CALC: 29.7 %
HGB BLD-MCNC: 8.2 G/DL
IMM GRANULOCYTES NFR BLD AUTO: 0.3 %
IRON SATN MFR SERPL: 5 %
IRON SERPL-MCNC: 17 UG/DL
LYMPHOCYTES # BLD AUTO: 3.28 K/UL
LYMPHOCYTES NFR BLD AUTO: 34.6 %
MAN DIFF?: NORMAL
MCHC RBC-ENTMCNC: 22 PG
MCHC RBC-ENTMCNC: 27.6 G/DL
MCV RBC AUTO: 79.8 FL
MONOCYTES # BLD AUTO: 0.89 K/UL
MONOCYTES NFR BLD AUTO: 9.4 %
NEUTROPHILS # BLD AUTO: 4.8 K/UL
NEUTROPHILS NFR BLD AUTO: 50.7 %
PLATELET # BLD AUTO: 261 K/UL
RBC # BLD: 3.72 M/UL
RBC # FLD: 17.4 %
TIBC SERPL-MCNC: 372 UG/DL
UIBC SERPL-MCNC: 355 UG/DL
WBC # FLD AUTO: 9.47 K/UL

## 2023-04-12 ENCOUNTER — OUTPATIENT (OUTPATIENT)
Dept: OUTPATIENT SERVICES | Facility: HOSPITAL | Age: 78
LOS: 1 days | End: 2023-04-12
Payer: MEDICARE

## 2023-04-12 ENCOUNTER — APPOINTMENT (OUTPATIENT)
Dept: HEMATOLOGY ONCOLOGY | Facility: CLINIC | Age: 78
End: 2023-04-12
Payer: MEDICARE

## 2023-04-12 ENCOUNTER — APPOINTMENT (OUTPATIENT)
Dept: INFUSION THERAPY | Facility: CLINIC | Age: 78
End: 2023-04-12
Payer: MEDICARE

## 2023-04-12 VITALS
HEART RATE: 73 BPM | SYSTOLIC BLOOD PRESSURE: 115 MMHG | RESPIRATION RATE: 16 BRPM | OXYGEN SATURATION: 94 % | DIASTOLIC BLOOD PRESSURE: 55 MMHG | TEMPERATURE: 98 F | BODY MASS INDEX: 24.91 KG/M2 | WEIGHT: 155 LBS | HEIGHT: 66 IN

## 2023-04-12 DIAGNOSIS — Z87.09 PERSONAL HISTORY OF OTHER DISEASES OF THE RESPIRATORY SYSTEM: ICD-10-CM

## 2023-04-12 DIAGNOSIS — D64.9 ANEMIA, UNSPECIFIED: ICD-10-CM

## 2023-04-12 DIAGNOSIS — Z85.51 PERSONAL HISTORY OF MALIGNANT NEOPLASM OF BLADDER: ICD-10-CM

## 2023-04-12 DIAGNOSIS — Z79.899 OTHER LONG TERM (CURRENT) DRUG THERAPY: ICD-10-CM

## 2023-04-12 DIAGNOSIS — Z72.0 TOBACCO USE: ICD-10-CM

## 2023-04-12 DIAGNOSIS — I48.21 PERMANENT ATRIAL FIBRILLATION: ICD-10-CM

## 2023-04-12 PROCEDURE — 99203 OFFICE O/P NEW LOW 30 MIN: CPT

## 2023-04-12 PROCEDURE — 99203 OFFICE O/P NEW LOW 30 MIN: CPT | Mod: 25

## 2023-04-12 PROCEDURE — 96365 THER/PROPH/DIAG IV INF INIT: CPT

## 2023-04-12 RX ORDER — IRON SUCROSE 20 MG/ML
200 INJECTION, SOLUTION INTRAVENOUS ONCE
Refills: 0 | Status: COMPLETED | OUTPATIENT
Start: 2023-04-12 | End: 2023-04-12

## 2023-04-12 RX ADMIN — IRON SUCROSE 220 MILLIGRAM(S): 20 INJECTION, SOLUTION INTRAVENOUS at 15:12

## 2023-04-12 RX ADMIN — IRON SUCROSE 200 MILLIGRAM(S): 20 INJECTION, SOLUTION INTRAVENOUS at 15:42

## 2023-04-13 DIAGNOSIS — D64.9 ANEMIA, UNSPECIFIED: ICD-10-CM

## 2023-04-13 PROBLEM — I48.21 PERMANENT ATRIAL FIBRILLATION: Status: RESOLVED | Noted: 2023-04-13 | Resolved: 2023-04-13

## 2023-04-13 PROBLEM — Z87.09 HISTORY OF PULMONARY EMPHYSEMA: Status: RESOLVED | Noted: 2023-04-13 | Resolved: 2023-04-13

## 2023-04-13 PROBLEM — Z72.0 TOBACCO USE: Status: ACTIVE | Noted: 2023-04-13

## 2023-04-13 PROBLEM — Z85.51 HISTORY OF MALIGNANT NEOPLASM OF BLADDER: Status: RESOLVED | Noted: 2023-04-13 | Resolved: 2023-04-13

## 2023-04-14 ENCOUNTER — APPOINTMENT (OUTPATIENT)
Dept: INFUSION THERAPY | Facility: CLINIC | Age: 78
End: 2023-04-14

## 2023-04-14 ENCOUNTER — OUTPATIENT (OUTPATIENT)
Dept: OUTPATIENT SERVICES | Facility: HOSPITAL | Age: 78
LOS: 1 days | End: 2023-04-14
Payer: MEDICARE

## 2023-04-14 ENCOUNTER — LABORATORY RESULT (OUTPATIENT)
Age: 78
End: 2023-04-14

## 2023-04-14 DIAGNOSIS — Z00.00 ENCOUNTER FOR GENERAL ADULT MEDICAL EXAMINATION W/OUT ABNORMAL FINDINGS: ICD-10-CM

## 2023-04-14 DIAGNOSIS — D64.9 ANEMIA, UNSPECIFIED: ICD-10-CM

## 2023-04-14 PROCEDURE — 85027 COMPLETE CBC AUTOMATED: CPT

## 2023-04-14 PROCEDURE — 96365 THER/PROPH/DIAG IV INF INIT: CPT

## 2023-04-14 RX ORDER — IRON SUCROSE 20 MG/ML
200 INJECTION, SOLUTION INTRAVENOUS ONCE
Refills: 0 | Status: COMPLETED | OUTPATIENT
Start: 2023-04-14 | End: 2023-04-14

## 2023-04-14 RX ADMIN — IRON SUCROSE 200 MILLIGRAM(S): 20 INJECTION, SOLUTION INTRAVENOUS at 14:10

## 2023-04-14 RX ADMIN — IRON SUCROSE 220 MILLIGRAM(S): 20 INJECTION, SOLUTION INTRAVENOUS at 13:39

## 2023-04-18 ENCOUNTER — APPOINTMENT (OUTPATIENT)
Dept: INFUSION THERAPY | Facility: CLINIC | Age: 78
End: 2023-04-18

## 2023-04-18 ENCOUNTER — OUTPATIENT (OUTPATIENT)
Dept: OUTPATIENT SERVICES | Facility: HOSPITAL | Age: 78
LOS: 1 days | End: 2023-04-18
Payer: MEDICARE

## 2023-04-18 DIAGNOSIS — D64.9 ANEMIA, UNSPECIFIED: ICD-10-CM

## 2023-04-18 PROCEDURE — 96365 THER/PROPH/DIAG IV INF INIT: CPT

## 2023-04-18 RX ORDER — IRON SUCROSE 20 MG/ML
200 INJECTION, SOLUTION INTRAVENOUS ONCE
Refills: 0 | Status: COMPLETED | OUTPATIENT
Start: 2023-04-18 | End: 2023-04-18

## 2023-04-18 RX ADMIN — IRON SUCROSE 220 MILLIGRAM(S): 20 INJECTION, SOLUTION INTRAVENOUS at 15:11

## 2023-04-19 PROBLEM — Z79.899 ENCOUNTER FOR MEDICATION MANAGEMENT: Status: ACTIVE | Noted: 2023-04-13

## 2023-04-19 LAB
HCT VFR BLD CALC: 31 %
HGB BLD-MCNC: 8.5 G/DL
MCHC RBC-ENTMCNC: 21.5 PG
MCHC RBC-ENTMCNC: 27.4 G/DL
MCV RBC AUTO: 78.3 FL
PLATELET # BLD AUTO: 245 K/UL
PMV BLD: 10.7 FL
RBC # BLD: 3.96 M/UL
RBC # FLD: 17.5 %
WBC # FLD AUTO: 8.27 K/UL

## 2023-04-19 NOTE — PHYSICAL EXAM
[Restricted in physically strenuous activity but ambulatory and able to carry out work of a light or sedentary nature] : Status 1- Restricted in physically strenuous activity but ambulatory and able to carry out work of a light or sedentary nature, e.g., light house work, office work [Normal] : RRR, normal S1S2, no murmurs, rubs, gallops [de-identified] : SOB on Exertion

## 2023-04-19 NOTE — ASSESSMENT
[FreeTextEntry1] : MIROSLAVA COPPOLA is 77 year old male , referred for IRON Deficiency Anemia.\par \par Plan:\par Blood work from 4/7/23 reviewed with HGb 8.2.\par Iron sat is  5 %. Serum Iron is 17.\par  We will start Venofer 200 mg 2 x / week for 5 doses.\par  Repeat CBC Next week.\par  Repeat Ferritin level and serum Iron in 5 weeks. \par  We explained possible side effect from Iron infusion not limited to anaphylactic reaction, headache , hives,  itching  wheezing, Difficulty breathing, a light headed feeling, swelling of face , lips , tongue or throat. \par delay reaction and abdominal discomfort.\par Follow up with cardiology.\par Follow up with Pulmonary. \par He is schedule for a follow up with Dr. Finley ( Oncologist in [facility]) for Bladder Cancer in 4/24/23. \par  Smoking cessation reinforced. we offer to send request for Smoking cessation program.  He decline for now.\par  Patient advised to follow up with GI.\par RTC in 6 weeks to follow up with DR. Zhang. \par \par \par

## 2023-04-19 NOTE — HISTORY OF PRESENT ILLNESS
[de-identified] : THOMAS COPPOLA is 77 year old male , referred from CORDELIA Perez for Low HGB and Ferritin level.\par Patient reports he experience  SOB on exertion,  decreased exercise tolerance, fatigue. NO CP.\par He denied rectal bleeding , he didn't have Colonoscopy from 15 yeas ago. \par Past medical History of : Bladder Cancer get treated by DR. Finley  in [facility] [place].\par  Small Cell Carcinoma of the skin, treated by Dr. CHRIS Gutierrez in [place].  \par  He has  COPD  on Home O2 intermittently , And Nebulizer, currently he is  still smoking. \par  He has Coronary Artery Disease and A FIB  on Aspirin and Plavix. \par \par His daughter states that he looks more short of breath when he does any activity. He has oxygen at home.as he desaturates during exercise but does not like to use it.  \par

## 2023-04-19 NOTE — REASON FOR VISIT
[Initial Consultation] : an initial consultation for [FreeTextEntry2] : Patient is here for iron Deficiency Anemia , Referred from PMD for Iron Infusion.

## 2023-04-20 DIAGNOSIS — Z72.0 TOBACCO USE: ICD-10-CM

## 2023-04-20 DIAGNOSIS — Z85.51 PERSONAL HISTORY OF MALIGNANT NEOPLASM OF BLADDER: ICD-10-CM

## 2023-04-20 DIAGNOSIS — Z79.899 OTHER LONG TERM (CURRENT) DRUG THERAPY: ICD-10-CM

## 2023-04-28 ENCOUNTER — LABORATORY RESULT (OUTPATIENT)
Age: 78
End: 2023-04-28

## 2023-04-28 ENCOUNTER — APPOINTMENT (OUTPATIENT)
Dept: INFUSION THERAPY | Facility: CLINIC | Age: 78
End: 2023-04-28

## 2023-04-28 ENCOUNTER — OUTPATIENT (OUTPATIENT)
Dept: OUTPATIENT SERVICES | Facility: HOSPITAL | Age: 78
LOS: 1 days | End: 2023-04-28
Payer: MEDICARE

## 2023-04-28 DIAGNOSIS — D64.9 ANEMIA, UNSPECIFIED: ICD-10-CM

## 2023-04-28 DIAGNOSIS — Z72.0 TOBACCO USE: ICD-10-CM

## 2023-04-28 DIAGNOSIS — E61.1 IRON DEFICIENCY: ICD-10-CM

## 2023-04-28 DIAGNOSIS — Z79.899 OTHER LONG TERM (CURRENT) DRUG THERAPY: ICD-10-CM

## 2023-04-28 DIAGNOSIS — Z85.51 PERSONAL HISTORY OF MALIGNANT NEOPLASM OF BLADDER: ICD-10-CM

## 2023-04-28 PROCEDURE — 85027 COMPLETE CBC AUTOMATED: CPT

## 2023-04-28 PROCEDURE — 96365 THER/PROPH/DIAG IV INF INIT: CPT

## 2023-04-28 RX ORDER — IRON SUCROSE 20 MG/ML
200 INJECTION, SOLUTION INTRAVENOUS ONCE
Refills: 0 | Status: COMPLETED | OUTPATIENT
Start: 2023-04-28 | End: 2023-04-28

## 2023-04-28 RX ADMIN — IRON SUCROSE 220 MILLIGRAM(S): 20 INJECTION, SOLUTION INTRAVENOUS at 14:32

## 2023-04-28 RX ADMIN — IRON SUCROSE 200 MILLIGRAM(S): 20 INJECTION, SOLUTION INTRAVENOUS at 15:02

## 2023-05-03 ENCOUNTER — OUTPATIENT (OUTPATIENT)
Dept: OUTPATIENT SERVICES | Facility: HOSPITAL | Age: 78
LOS: 1 days | End: 2023-05-03
Payer: MEDICARE

## 2023-05-03 ENCOUNTER — APPOINTMENT (OUTPATIENT)
Dept: INFUSION THERAPY | Facility: CLINIC | Age: 78
End: 2023-05-03

## 2023-05-03 DIAGNOSIS — D64.9 ANEMIA, UNSPECIFIED: ICD-10-CM

## 2023-05-03 DIAGNOSIS — Z72.0 TOBACCO USE: ICD-10-CM

## 2023-05-03 DIAGNOSIS — E61.1 IRON DEFICIENCY: ICD-10-CM

## 2023-05-03 DIAGNOSIS — Z85.51 PERSONAL HISTORY OF MALIGNANT NEOPLASM OF BLADDER: ICD-10-CM

## 2023-05-03 DIAGNOSIS — Z79.899 OTHER LONG TERM (CURRENT) DRUG THERAPY: ICD-10-CM

## 2023-05-03 PROCEDURE — 96365 THER/PROPH/DIAG IV INF INIT: CPT

## 2023-05-03 RX ORDER — IRON SUCROSE 20 MG/ML
200 INJECTION, SOLUTION INTRAVENOUS ONCE
Refills: 0 | Status: COMPLETED | OUTPATIENT
Start: 2023-05-03 | End: 2023-05-03

## 2023-05-03 RX ADMIN — IRON SUCROSE 220 MILLIGRAM(S): 20 INJECTION, SOLUTION INTRAVENOUS at 13:08

## 2023-05-03 RX ADMIN — IRON SUCROSE 200 MILLIGRAM(S): 20 INJECTION, SOLUTION INTRAVENOUS at 13:40

## 2023-05-16 ENCOUNTER — LABORATORY RESULT (OUTPATIENT)
Age: 78
End: 2023-05-16

## 2023-05-16 ENCOUNTER — APPOINTMENT (OUTPATIENT)
Dept: HEMATOLOGY ONCOLOGY | Facility: CLINIC | Age: 78
End: 2023-05-16
Payer: MEDICARE

## 2023-05-16 ENCOUNTER — OUTPATIENT (OUTPATIENT)
Dept: OUTPATIENT SERVICES | Facility: HOSPITAL | Age: 78
LOS: 1 days | End: 2023-05-16
Payer: MEDICARE

## 2023-05-16 VITALS
SYSTOLIC BLOOD PRESSURE: 128 MMHG | HEART RATE: 59 BPM | DIASTOLIC BLOOD PRESSURE: 60 MMHG | TEMPERATURE: 97.9 F | RESPIRATION RATE: 16 BRPM

## 2023-05-16 DIAGNOSIS — D64.9 ANEMIA, UNSPECIFIED: ICD-10-CM

## 2023-05-16 DIAGNOSIS — Z79.899 OTHER LONG TERM (CURRENT) DRUG THERAPY: ICD-10-CM

## 2023-05-16 DIAGNOSIS — Z85.51 PERSONAL HISTORY OF MALIGNANT NEOPLASM OF BLADDER: ICD-10-CM

## 2023-05-16 DIAGNOSIS — Z72.0 TOBACCO USE: ICD-10-CM

## 2023-05-16 DIAGNOSIS — E61.1 IRON DEFICIENCY: ICD-10-CM

## 2023-05-16 LAB
HCT VFR BLD CALC: 38 %
HGB BLD-MCNC: 11.9 G/DL
MCHC RBC-ENTMCNC: 26.2 PG
MCHC RBC-ENTMCNC: 31.3 G/DL
MCV RBC AUTO: 83.5 FL
PLATELET # BLD AUTO: 169 K/UL
PMV BLD: 10.2 FL
RBC # BLD: 4.55 M/UL
RBC # FLD: 24.9 %
WBC # FLD AUTO: 6.93 K/UL

## 2023-05-16 PROCEDURE — 99213 OFFICE O/P EST LOW 20 MIN: CPT

## 2023-05-16 PROCEDURE — 82746 ASSAY OF FOLIC ACID SERUM: CPT

## 2023-05-16 PROCEDURE — 36415 COLL VENOUS BLD VENIPUNCTURE: CPT

## 2023-05-16 PROCEDURE — 82728 ASSAY OF FERRITIN: CPT

## 2023-05-16 PROCEDURE — 82607 VITAMIN B-12: CPT

## 2023-05-16 PROCEDURE — 84443 ASSAY THYROID STIM HORMONE: CPT

## 2023-05-16 PROCEDURE — 84439 ASSAY OF FREE THYROXINE: CPT

## 2023-05-16 PROCEDURE — 85027 COMPLETE CBC AUTOMATED: CPT

## 2023-05-17 LAB
FERRITIN SERPL-MCNC: 155 NG/ML
FOLATE SERPL-MCNC: 14.6 NG/ML
T4 FREE SERPL-MCNC: 0.8 NG/DL
TSH SERPL-ACNC: 9.48 UIU/ML
VIT B12 SERPL-MCNC: 727 PG/ML

## 2023-05-18 LAB
HCT VFR BLD CALC: 35.2 %
HGB BLD-MCNC: 9.9 G/DL
MCHC RBC-ENTMCNC: 23.2 PG
MCHC RBC-ENTMCNC: 28.1 G/DL
MCV RBC AUTO: 82.6 FL
PLATELET # BLD AUTO: 229 K/UL
PMV BLD: 10.9 FL
RBC # BLD: 4.26 M/UL
RBC # FLD: 23.7 %
WBC # FLD AUTO: 8.81 K/UL

## 2023-05-18 NOTE — HISTORY OF PRESENT ILLNESS
[de-identified] : \par THOMAS COPPOLA is 77 year old male , referred from [facility] for Low HGB and Ferritin level.\par Patient reports he experience SOB on exertion, decreased exercise tolerance, fatigue. NO CP.\par He denied rectal bleeding , he didn't have Colonoscopy from 15 yeas ago. \par Past medical History of : Bladder Cancer get treated by DR. Finley in [facility] In [place].\par  Small Cell Carcinoma of the skin, treated by Dr. CHRIS Gutierrez in [place]. \par  He has COPD on Home O2 intermittently , And Nebulizer, currently he is still smoking. \par  He has Coronary Artery Disease and A FIB on Aspirin and Plavix. \par \par His daughter states that he looks more short of breath when he does any activity. He has oxygen at home.as he desaturates during exercise but does not like to use it. \par  \par  [de-identified] : 5/16/2023 Patient returns after venofer . He continues to complain of fatigue , feels wiped out at end of the day . no hematochezia.

## 2023-05-18 NOTE — ASSESSMENT
[FreeTextEntry1] : 77 year old male with iron deficiency anemia ,S /P venofer.\par Hgb 11.9 , no symptomatic improvement .\par Plan ; ferritin , B12 , thyroid fucntion tests . \par          cbc , ferritin in3 months .\par \par \par P.S tests show elevated TSH and low free T 4 , will start on synthroid  25 micro .

## 2023-06-20 DIAGNOSIS — Z99.81 DEPENDENCE ON SUPPLEMENTAL OXYGEN: ICD-10-CM

## 2023-06-21 ENCOUNTER — APPOINTMENT (OUTPATIENT)
Dept: CARDIOLOGY | Facility: CLINIC | Age: 78
End: 2023-06-21
Payer: MEDICARE

## 2023-06-21 VITALS
SYSTOLIC BLOOD PRESSURE: 118 MMHG | WEIGHT: 154 LBS | OXYGEN SATURATION: 90 % | HEIGHT: 66 IN | HEART RATE: 41 BPM | BODY MASS INDEX: 24.75 KG/M2 | DIASTOLIC BLOOD PRESSURE: 85 MMHG

## 2023-06-21 DIAGNOSIS — R42 DIZZINESS AND GIDDINESS: ICD-10-CM

## 2023-06-21 DIAGNOSIS — Z95.1 PRESENCE OF AORTOCORONARY BYPASS GRAFT: ICD-10-CM

## 2023-06-21 PROCEDURE — 99214 OFFICE O/P EST MOD 30 MIN: CPT | Mod: 25

## 2023-06-21 PROCEDURE — 93000 ELECTROCARDIOGRAM COMPLETE: CPT | Mod: 59

## 2023-06-21 PROCEDURE — 99406 BEHAV CHNG SMOKING 3-10 MIN: CPT

## 2023-06-21 PROCEDURE — 93246 EXT ECG>7D<15D RECORDING: CPT

## 2023-06-21 NOTE — HISTORY OF PRESENT ILLNESS
[FreeTextEntry1] : \par 79 y/o w PMH CAD, s/p CABG, PAD, PAF on amiodarone, severe COPD  Dr Marie on intermittent home O2 daily tobacco abuse, MOO, bladder cancer received chemo at [facility] now cancer free .Sees Dr Hayes. Sees heme recently received 5 Venefer infusions remains w fatigue and SOB.Pt had covid 12/21.Pt had neg Dobutamine SE 4/18.No cardio eval done since then. . He gets occasional lightheadedness no syncope. He does not always use his oxygen and has had hypoxia also may be contributing to his lightheadedness. EKG w marked sinus kellie 42 BPM/ Pt on metoprolol and amio 5 days/wk\par C/o

## 2023-06-21 NOTE — PHYSICAL EXAM
[Well Developed] : well developed [Well Nourished] : well nourished [No Acute Distress] : no acute distress [Normal Conjunctiva] : normal conjunctiva [Normal Venous Pressure] : normal venous pressure [Decreased Breath Sounds] : breath sounds were decreased diffusely [Decreased Breath Sounds] : breath sounds were not diminished [Normal Gait] : normal gait [No Edema] : no edema [Alert and Oriented] : alert and oriented [Normal Rate] : normal [Normal S1] : normal S1 [Normal S2] : normal S2 [No Murmur] : no murmurs heard [No Pitting Edema] : no pitting edema present [Rt] : varicose veins of the right leg noted [Lt] : varicose veins of the left leg noted [2+] : left 2+ [No Abnormalities] : the abdominal aorta was not enlarged and no bruit was heard [S3] : no S3 [S4] : no S4 [Right Carotid Bruit] : no bruit heard over the right carotid [Left Carotid Bruit] : no bruit heard over the left carotid [Right Femoral Bruit] : no bruit heard over the right femoral artery [Left Femoral Bruit] : no bruit heard over the left femoral artery [de-identified] : varicose veins legs

## 2023-06-21 NOTE — REVIEW OF SYSTEMS
[Hearing Loss] : hearing loss [Dyspnea on exertion] : dyspnea during exertion [Cough] : cough [Wheezing] : wheezing [Fever] : no fever [Chills] : no chills [Blurry Vision] : no blurred vision [Abdominal Pain] : no abdominal pain [Joint Pain] : no joint pain [Rash] : no rash [Dizziness] : no dizziness [Confusion] : no confusion was observed [Easy Bleeding] : no tendency for easy bleeding

## 2023-06-21 NOTE — DISCUSSION/SUMMARY
[FreeTextEntry1] : \par 77 y/o w PMH CAD, s/p CABG, PAD, PAF on amiodarone, severe COPD  Dr Marie on intermittent home O2 daily tobacco abuse, MOO, bladder cancer received chemo at [facility] now cancer free .Sees Dr Hayes. Sees heme recently received 5 Venefer infusions remains w fatigue and SOB.Pt had covid 12/21.Pt had neg Dobutamine SE 4/18.. He gets shoulder pain  per Dr RICHARDS is non cardiac. Pt with rare palpitations. Pt smokes 1/2 PPD. Reviewed need to stop smoking offered cessation modalities refuses for now. . On Amio: PFT by Dr Jose, TFT monitored recently begun on 25 mgm Synthroid Dr Leonard. but pt was not told to start it . He started his 88 mcgm he has in the past. On it about 1 mth.  Will ck TFT today. Pt has not had  Optho   strongly recommended to be done every 6 mth. Pt with marked bradycardia w occasional lightheadedness. Need to assess for PAF. If holter neg consider reducing amio or BB. Ordered lexiscan for cardiac eval. Pt here with daughter . All questioned answered.

## 2023-06-21 NOTE — COUNSELING
[Yes] : Risk of tobacco use and health benefits of smoking cessation discussed: Yes [Cessation strategies including cessation program discussed] : Cessation strategies including cessation program discussed [Encouraged to pick a quit date and identify support needed to quit] : Encouraged to pick a quit date and identify support needed to quit [No] : Not willing to quit smoking [FreeTextEntry1] : 10

## 2023-06-22 LAB
ANION GAP SERPL CALC-SCNC: 11 MMOL/L
BUN SERPL-MCNC: 26 MG/DL
CALCIUM SERPL-MCNC: 9.5 MG/DL
CHLORIDE SERPL-SCNC: 101 MMOL/L
CO2 SERPL-SCNC: 29 MMOL/L
CREAT SERPL-MCNC: 1.2 MG/DL
EGFR: 61 ML/MIN/1.73M2
GLUCOSE SERPL-MCNC: 99 MG/DL
POTASSIUM SERPL-SCNC: 5.2 MMOL/L
SODIUM SERPL-SCNC: 141 MMOL/L
T3 SERPL-MCNC: 79 NG/DL
T4 FREE SERPL-MCNC: 1.8 NG/DL
TSH SERPL-ACNC: 1.92 UIU/ML

## 2023-08-02 ENCOUNTER — OUTPATIENT (OUTPATIENT)
Dept: OUTPATIENT SERVICES | Facility: HOSPITAL | Age: 78
LOS: 1 days | End: 2023-08-02
Payer: MEDICARE

## 2023-08-02 ENCOUNTER — APPOINTMENT (OUTPATIENT)
Dept: CV DIAGNOSTICS | Facility: HOSPITAL | Age: 78
End: 2023-08-02
Payer: MEDICARE

## 2023-08-02 DIAGNOSIS — Z79.899 OTHER LONG TERM (CURRENT) DRUG THERAPY: ICD-10-CM

## 2023-08-02 PROCEDURE — 78452 HT MUSCLE IMAGE SPECT MULT: CPT | Mod: 26,ME

## 2023-08-02 PROCEDURE — A9500: CPT

## 2023-08-02 PROCEDURE — 93018 CV STRESS TEST I&R ONLY: CPT

## 2023-08-02 PROCEDURE — 93016 CV STRESS TEST SUPVJ ONLY: CPT

## 2023-08-02 PROCEDURE — G1004: CPT

## 2023-08-02 PROCEDURE — 78452 HT MUSCLE IMAGE SPECT MULT: CPT | Mod: ME

## 2023-08-02 PROCEDURE — 93017 CV STRESS TEST TRACING ONLY: CPT

## 2023-08-03 DIAGNOSIS — Z79.899 OTHER LONG TERM (CURRENT) DRUG THERAPY: ICD-10-CM

## 2023-08-23 ENCOUNTER — APPOINTMENT (OUTPATIENT)
Dept: CARDIOLOGY | Facility: CLINIC | Age: 78
End: 2023-08-23
Payer: MEDICARE

## 2023-08-23 VITALS
BODY MASS INDEX: 24.43 KG/M2 | SYSTOLIC BLOOD PRESSURE: 120 MMHG | OXYGEN SATURATION: 90 % | HEIGHT: 66 IN | HEART RATE: 48 BPM | DIASTOLIC BLOOD PRESSURE: 60 MMHG | WEIGHT: 152 LBS

## 2023-08-23 DIAGNOSIS — I48.0 PAROXYSMAL ATRIAL FIBRILLATION: ICD-10-CM

## 2023-08-23 PROCEDURE — 99214 OFFICE O/P EST MOD 30 MIN: CPT

## 2023-08-23 NOTE — PHYSICAL EXAM
[Well Developed] : well developed [Well Nourished] : well nourished [No Acute Distress] : no acute distress [Normal Conjunctiva] : normal conjunctiva [Normal Venous Pressure] : normal venous pressure [Normal S1] : normal S1 [Normal S2] : normal S2 [No Murmur] : no murmurs heard [No Pitting Edema] : no pitting edema present [Rt] : varicose veins of the right leg noted [Lt] : varicose veins of the left leg noted [2+] : left 2+ [No Abnormalities] : the abdominal aorta was not enlarged and no bruit was heard [Normal Rate] : the respiratory rate was normal [Decreased Breath Sounds] : breath sounds were decreased diffusely [Normal Gait] : normal gait [Decreased Breath Sounds] : breath sounds were not diminished [No Edema] : no edema [Alert and Oriented] : alert and oriented [S3] : no S3 [S4] : no S4 [Right Carotid Bruit] : no bruit heard over the right carotid [Left Carotid Bruit] : no bruit heard over the left carotid [Right Femoral Bruit] : no bruit heard over the right femoral artery [Left Femoral Bruit] : no bruit heard over the left femoral artery [Wet Cough] : a wet cough was heard [Rhonchi Bilateral] : rhonchi were heard diffusely over both lungs [de-identified] : varicose veins legs

## 2023-08-23 NOTE — HISTORY OF PRESENT ILLNESS
[FreeTextEntry1] : 79 y/o w PMH CAD, s/p CABG, PAD, PAF on amiodarone, severe COPD  Dr Marie on intermittent home O2 daily tobacco abuse, MOO, bladder cancer received chemo at [facility] now cancer free .Sees Dr Hayes. Sees heme recently received 5 Venefer infusions remains w fatigue and SOB.Pt had covid 12/21.Pt had neg Dobutamine SE 4/18.No cardio eval done since then. . He does not always use his oxygen and has had hypoxia also may be contributing to his lightheadedness.  Lexiscan 8/2/23 Normal  EF 59%. Holter done 6/21/23  Average HR 52 BPM freq PAC SVT up o 4 beats Ectopic atrial  tach . Longest pause 2.3sec Pt feels better with less lightheadedness and dizziness and only rare palpitations. Pt denies chest pain, shortness of breath,  palpitations,dizziness, syncope or near syncope.

## 2023-08-23 NOTE — DISCUSSION/SUMMARY
[FreeTextEntry1] : 79 y/o w PMH CAD, s/p CABG, PAD, PAF on amiodarone, severe COPD  Dr Marie on intermittent home O2 daily tobacco abuse, MOO, bladder cancer received chemo at [facility] now cancer free .Sees Dr Hayes. Sees heme recently received 5 Venefer infusions remains w fatigue and SOB.Pt had covid 12/21.Pt had neg Dobutamine SE 4/18.. He gets shoulder pain  per Dr RICHARDS is non cardiac. Pt with rare palpitations. Pt smokes 1/2 PPD. Reviewed need to stop smoking offered cessation modalities refuses for now. . On Amio: PFT by Dr Jose, TFT monitored recently begun on 25 mgm Synthroid Dr Leonard. but pt was not told to start it . He started his 88 mcgm he has in the past. On it about 1 mth.  Will ck TFT today. Pt has not had  Optho   strongly recommended to be done every 6 mth. Pt with marked bradycardia w occasional lightheadedness. Need to assess for PAF. If holter neg consider reducing amio or BB. Ordered lexiscan for cardiac eval. Pt here with daughter . All questioned answered. 8/23/23 Lexiscan 8//2/23 Normal  EF 59%. Holter done 6/21/23  Average HR 52 BPM freq PAC SVT up o 4 beats Ectopic atrial  tach . Longest pause 2.3sec NO A FIB , He feels better with less palpitations. Will continue present mgmt with amio and metoprolol, Reviewed all tests with pt and daughter all questions answered.  Re enforced need for eye exam once again

## 2023-08-29 ENCOUNTER — APPOINTMENT (OUTPATIENT)
Dept: HEMATOLOGY ONCOLOGY | Facility: CLINIC | Age: 78
End: 2023-08-29
Payer: MEDICARE

## 2023-08-29 ENCOUNTER — OUTPATIENT (OUTPATIENT)
Dept: OUTPATIENT SERVICES | Facility: HOSPITAL | Age: 78
LOS: 1 days | End: 2023-08-29
Payer: MEDICARE

## 2023-08-29 ENCOUNTER — LABORATORY RESULT (OUTPATIENT)
Age: 78
End: 2023-08-29

## 2023-08-29 VITALS
HEART RATE: 77 BPM | SYSTOLIC BLOOD PRESSURE: 127 MMHG | HEIGHT: 66 IN | TEMPERATURE: 98.4 F | RESPIRATION RATE: 16 BRPM | OXYGEN SATURATION: 89 % | DIASTOLIC BLOOD PRESSURE: 87 MMHG | BODY MASS INDEX: 24.75 KG/M2 | WEIGHT: 154 LBS

## 2023-08-29 DIAGNOSIS — D64.9 ANEMIA, UNSPECIFIED: ICD-10-CM

## 2023-08-29 DIAGNOSIS — C67.9 MALIGNANT NEOPLASM OF BLADDER, UNSPECIFIED: ICD-10-CM

## 2023-08-29 DIAGNOSIS — Z79.899 OTHER LONG TERM (CURRENT) DRUG THERAPY: ICD-10-CM

## 2023-08-29 DIAGNOSIS — Z72.0 TOBACCO USE: ICD-10-CM

## 2023-08-29 DIAGNOSIS — Z85.51 PERSONAL HISTORY OF MALIGNANT NEOPLASM OF BLADDER: ICD-10-CM

## 2023-08-29 LAB
ALBUMIN SERPL ELPH-MCNC: 4.1 G/DL
ALP BLD-CCNC: 95 U/L
ALT SERPL-CCNC: 11 U/L
ANION GAP SERPL CALC-SCNC: 10 MMOL/L
AST SERPL-CCNC: 17 U/L
BILIRUB SERPL-MCNC: 0.7 MG/DL
BUN SERPL-MCNC: 23 MG/DL
CALCIUM SERPL-MCNC: 9.6 MG/DL
CHLORIDE SERPL-SCNC: 99 MMOL/L
CO2 SERPL-SCNC: 26 MMOL/L
CREAT SERPL-MCNC: 1.2 MG/DL
EGFR: 61 ML/MIN/1.73M2
GLUCOSE SERPL-MCNC: 87 MG/DL
HCT VFR BLD CALC: 26.4 %
HGB BLD-MCNC: 7.7 G/DL
MCHC RBC-ENTMCNC: 24.3 PG
MCHC RBC-ENTMCNC: 29.2 G/DL
MCV RBC AUTO: 83.3 FL
PLATELET # BLD AUTO: 286 K/UL
PMV BLD: 9.3 FL
POTASSIUM SERPL-SCNC: 4.5 MMOL/L
PROT SERPL-MCNC: 7.1 G/DL
RBC # BLD: 3.17 M/UL
RBC # FLD: 15.9 %
SODIUM SERPL-SCNC: 135 MMOL/L
WBC # FLD AUTO: 8.09 K/UL

## 2023-08-29 PROCEDURE — 80053 COMPREHEN METABOLIC PANEL: CPT

## 2023-08-29 PROCEDURE — 84443 ASSAY THYROID STIM HORMONE: CPT

## 2023-08-29 PROCEDURE — 85027 COMPLETE CBC AUTOMATED: CPT

## 2023-08-29 PROCEDURE — 99213 OFFICE O/P EST LOW 20 MIN: CPT

## 2023-08-29 PROCEDURE — 82728 ASSAY OF FERRITIN: CPT

## 2023-08-30 DIAGNOSIS — C67.9 MALIGNANT NEOPLASM OF BLADDER, UNSPECIFIED: ICD-10-CM

## 2023-08-30 LAB
FERRITIN SERPL-MCNC: 10 NG/ML
TSH SERPL-ACNC: 1.81 UIU/ML

## 2023-09-05 NOTE — ASSESSMENT
[FreeTextEntry1] : 77 year old male with   #)Iron deficiency anemia  No GI workup done, last colonoscopy 15 yrs ago.  S /P Venofer x5 in 4/2023 Hb in 5/23 was 11.9, ferritin 155, no symptomatic improvement TSH was elevated with low FT4, and was started on Synthroid 25mcg, now TFTs stable.  Plan ; Check CBC, CMP, TSH, ferritin today           CBC showed Hb dropped to 7.7, and we called the pt, informed him and his daughter about the results.           It was strongly advised to follow up with GI for EGD/colonoscopy.            We will schedule him for Venofer x5. CBC will be repeated prior to 1st infusion. He will be set up for            transfusion if Hb <7 at that time.             RTC 1 month with CBC, ferritin.   Pt seen and plan discussed with Dr Zhang.

## 2023-09-05 NOTE — HISTORY OF PRESENT ILLNESS
[de-identified] : THOMAS COPPOLA is 77 year old male , referred from [facility] for Low HGB and Ferritin level. Patient reports he experience SOB on exertion, decreased exercise tolerance, fatigue. NO CP. He denied rectal bleeding , he didn't have Colonoscopy from 15 yeas ago.  Past medical History of : Bladder Cancer get treated by DR. Finley in [facility] In [place].  Small Cell Carcinoma of the skin, treated by Dr. CHRIS Gutierrez in [place].   He has COPD on Home O2 intermittently , And Nebulizer, currently he is still smoking.   He has Coronary Artery Disease and A FIB on Aspirin and Plavix.   His daughter states that he looks more short of breath when he does any activity. He has oxygen at home.as he desaturates during exercise but does not like to use it.     [de-identified] : 5/16/2023 Patient returns after venofer . He continues to complain of fatigue , feels wiped out at end of the day . no hematochezia.  8/29/23: Pt returns for follow up. He is s/p Venofer x5 in 4/2023. He reports fatigue, no other symptoms. He denied any bleeding. He was started on synthroid 25 mcg for hypothyroidism. He continues to follow with dermatology for scc of nose. He has not had colonoscopy in the past 15 years.

## 2023-09-12 ENCOUNTER — APPOINTMENT (OUTPATIENT)
Dept: PULMONOLOGY | Facility: CLINIC | Age: 78
End: 2023-09-12
Payer: MEDICARE

## 2023-09-12 ENCOUNTER — APPOINTMENT (OUTPATIENT)
Dept: CARDIOLOGY | Facility: CLINIC | Age: 78
End: 2023-09-12

## 2023-09-12 VITALS
OXYGEN SATURATION: 92 % | DIASTOLIC BLOOD PRESSURE: 52 MMHG | HEIGHT: 66 IN | HEART RATE: 57 BPM | SYSTOLIC BLOOD PRESSURE: 140 MMHG | WEIGHT: 157 LBS | BODY MASS INDEX: 25.23 KG/M2

## 2023-09-12 PROCEDURE — 99213 OFFICE O/P EST LOW 20 MIN: CPT

## 2023-09-12 RX ORDER — TIOTROPIUM BROMIDE 18 UG/1
18 CAPSULE ORAL; RESPIRATORY (INHALATION) DAILY
Qty: 1 | Refills: 3 | Status: ACTIVE | COMMUNITY
Start: 2023-09-12 | End: 1900-01-01

## 2023-09-13 ENCOUNTER — APPOINTMENT (OUTPATIENT)
Dept: INFUSION THERAPY | Facility: CLINIC | Age: 78
End: 2023-09-13

## 2023-09-13 ENCOUNTER — APPOINTMENT (OUTPATIENT)
Dept: PULMONOLOGY | Facility: CLINIC | Age: 78
End: 2023-09-13

## 2023-09-13 ENCOUNTER — RX CHANGE (OUTPATIENT)
Age: 78
End: 2023-09-13

## 2023-09-13 ENCOUNTER — OUTPATIENT (OUTPATIENT)
Dept: OUTPATIENT SERVICES | Facility: HOSPITAL | Age: 78
LOS: 1 days | End: 2023-09-13
Payer: MEDICARE

## 2023-09-13 ENCOUNTER — LABORATORY RESULT (OUTPATIENT)
Age: 78
End: 2023-09-13

## 2023-09-13 DIAGNOSIS — E61.1 IRON DEFICIENCY: ICD-10-CM

## 2023-09-13 LAB
ABO + RH PNL BLD: NORMAL
BLD GP AB SCN SERPL QL: NORMAL
HCT VFR BLD CALC: 25.9 %
HGB BLD-MCNC: 7.4 G/DL
MCHC RBC-ENTMCNC: 22.6 PG
MCHC RBC-ENTMCNC: 28.6 G/DL
MCV RBC AUTO: 79.2 FL
PLATELET # BLD AUTO: 240 K/UL
PMV BLD: 10.3 FL
RBC # BLD: 3.27 M/UL
RBC # FLD: 17.7 %
WBC # FLD AUTO: 9.33 K/UL

## 2023-09-13 PROCEDURE — 86850 RBC ANTIBODY SCREEN: CPT

## 2023-09-13 PROCEDURE — 86901 BLOOD TYPING SEROLOGIC RH(D): CPT

## 2023-09-13 PROCEDURE — 96365 THER/PROPH/DIAG IV INF INIT: CPT

## 2023-09-13 PROCEDURE — 36415 COLL VENOUS BLD VENIPUNCTURE: CPT

## 2023-09-13 PROCEDURE — 85027 COMPLETE CBC AUTOMATED: CPT

## 2023-09-13 PROCEDURE — 86900 BLOOD TYPING SEROLOGIC ABO: CPT

## 2023-09-13 RX ORDER — IRON SUCROSE 20 MG/ML
200 INJECTION, SOLUTION INTRAVENOUS ONCE
Refills: 0 | Status: COMPLETED | OUTPATIENT
Start: 2023-09-13 | End: 2023-09-13

## 2023-09-13 RX ADMIN — IRON SUCROSE 220 MILLIGRAM(S): 20 INJECTION, SOLUTION INTRAVENOUS at 10:13

## 2023-09-13 RX ADMIN — IRON SUCROSE 200 MILLIGRAM(S): 20 INJECTION, SOLUTION INTRAVENOUS at 10:45

## 2023-09-14 DIAGNOSIS — E61.1 IRON DEFICIENCY: ICD-10-CM

## 2023-09-15 ENCOUNTER — APPOINTMENT (OUTPATIENT)
Dept: INFUSION THERAPY | Facility: CLINIC | Age: 78
End: 2023-09-15

## 2023-09-15 ENCOUNTER — OUTPATIENT (OUTPATIENT)
Dept: OUTPATIENT SERVICES | Facility: HOSPITAL | Age: 78
LOS: 1 days | End: 2023-09-15
Payer: MEDICARE

## 2023-09-15 DIAGNOSIS — E61.1 IRON DEFICIENCY: ICD-10-CM

## 2023-09-15 LAB — ABO RH CONFIRMATION: SIGNIFICANT CHANGE UP

## 2023-09-15 PROCEDURE — 86923 COMPATIBILITY TEST ELECTRIC: CPT

## 2023-09-15 PROCEDURE — 36415 COLL VENOUS BLD VENIPUNCTURE: CPT

## 2023-09-15 PROCEDURE — 96365 THER/PROPH/DIAG IV INF INIT: CPT

## 2023-09-15 PROCEDURE — P9040: CPT

## 2023-09-15 PROCEDURE — 36430 TRANSFUSION BLD/BLD COMPNT: CPT

## 2023-09-15 RX ORDER — IRON SUCROSE 20 MG/ML
200 INJECTION, SOLUTION INTRAVENOUS ONCE
Refills: 0 | Status: COMPLETED | OUTPATIENT
Start: 2023-09-15 | End: 2023-09-15

## 2023-09-15 RX ADMIN — IRON SUCROSE 220 MILLIGRAM(S): 20 INJECTION, SOLUTION INTRAVENOUS at 14:30

## 2023-09-15 RX ADMIN — IRON SUCROSE 200 MILLIGRAM(S): 20 INJECTION, SOLUTION INTRAVENOUS at 15:00

## 2023-09-18 ENCOUNTER — APPOINTMENT (OUTPATIENT)
Dept: INFUSION THERAPY | Facility: CLINIC | Age: 78
End: 2023-09-18

## 2023-09-18 ENCOUNTER — OUTPATIENT (OUTPATIENT)
Dept: OUTPATIENT SERVICES | Facility: HOSPITAL | Age: 78
LOS: 1 days | End: 2023-09-18
Payer: MEDICARE

## 2023-09-18 ENCOUNTER — LABORATORY RESULT (OUTPATIENT)
Age: 78
End: 2023-09-18

## 2023-09-18 DIAGNOSIS — E61.1 IRON DEFICIENCY: ICD-10-CM

## 2023-09-18 LAB
HCT VFR BLD CALC: 30.7 %
HGB BLD-MCNC: 8.6 G/DL
MCHC RBC-ENTMCNC: 23.4 PG
MCHC RBC-ENTMCNC: 28 G/DL
MCV RBC AUTO: 83.7 FL
PLATELET # BLD AUTO: 258 K/UL
PMV BLD: 9.7 FL
RBC # BLD: 3.67 M/UL
RBC # FLD: 19.7 %
WBC # FLD AUTO: 7.89 K/UL

## 2023-09-18 PROCEDURE — 85027 COMPLETE CBC AUTOMATED: CPT

## 2023-09-18 PROCEDURE — 96365 THER/PROPH/DIAG IV INF INIT: CPT

## 2023-09-18 PROCEDURE — 36415 COLL VENOUS BLD VENIPUNCTURE: CPT

## 2023-09-18 RX ORDER — IRON SUCROSE 20 MG/ML
200 INJECTION, SOLUTION INTRAVENOUS ONCE
Refills: 0 | Status: COMPLETED | OUTPATIENT
Start: 2023-09-18 | End: 2023-09-18

## 2023-09-18 RX ADMIN — IRON SUCROSE 200 MILLIGRAM(S): 20 INJECTION, SOLUTION INTRAVENOUS at 13:35

## 2023-09-18 RX ADMIN — IRON SUCROSE 220 MILLIGRAM(S): 20 INJECTION, SOLUTION INTRAVENOUS at 14:03

## 2023-09-20 ENCOUNTER — OUTPATIENT (OUTPATIENT)
Dept: OUTPATIENT SERVICES | Facility: HOSPITAL | Age: 78
LOS: 1 days | End: 2023-09-20
Payer: MEDICARE

## 2023-09-20 ENCOUNTER — APPOINTMENT (OUTPATIENT)
Dept: INFUSION THERAPY | Facility: CLINIC | Age: 78
End: 2023-09-20

## 2023-09-20 ENCOUNTER — LABORATORY RESULT (OUTPATIENT)
Age: 78
End: 2023-09-20

## 2023-09-20 DIAGNOSIS — E61.1 IRON DEFICIENCY: ICD-10-CM

## 2023-09-20 LAB
HCT VFR BLD CALC: 28.6 %
HGB BLD-MCNC: 8.3 G/DL
MCHC RBC-ENTMCNC: 24.3 PG
MCHC RBC-ENTMCNC: 29 G/DL
MCV RBC AUTO: 83.6 FL
PLATELET # BLD AUTO: 231 K/UL
PMV BLD: 10.6 FL
RBC # BLD: 3.42 M/UL
RBC # FLD: 21.1 %
WBC # FLD AUTO: 8.37 K/UL

## 2023-09-20 PROCEDURE — 85027 COMPLETE CBC AUTOMATED: CPT

## 2023-09-20 PROCEDURE — 96365 THER/PROPH/DIAG IV INF INIT: CPT

## 2023-09-20 RX ORDER — IRON SUCROSE 20 MG/ML
200 INJECTION, SOLUTION INTRAVENOUS ONCE
Refills: 0 | Status: COMPLETED | OUTPATIENT
Start: 2023-09-20 | End: 2023-09-20

## 2023-09-20 RX ADMIN — IRON SUCROSE 220 MILLIGRAM(S): 20 INJECTION, SOLUTION INTRAVENOUS at 14:23

## 2023-09-20 RX ADMIN — IRON SUCROSE 200 MILLIGRAM(S): 20 INJECTION, SOLUTION INTRAVENOUS at 14:26

## 2023-09-22 ENCOUNTER — APPOINTMENT (OUTPATIENT)
Dept: INFUSION THERAPY | Facility: CLINIC | Age: 78
End: 2023-09-22

## 2023-09-22 ENCOUNTER — LABORATORY RESULT (OUTPATIENT)
Age: 78
End: 2023-09-22

## 2023-09-22 ENCOUNTER — OUTPATIENT (OUTPATIENT)
Dept: OUTPATIENT SERVICES | Facility: HOSPITAL | Age: 78
LOS: 1 days | End: 2023-09-22
Payer: MEDICARE

## 2023-09-22 DIAGNOSIS — E61.1 IRON DEFICIENCY: ICD-10-CM

## 2023-09-22 PROCEDURE — 96365 THER/PROPH/DIAG IV INF INIT: CPT

## 2023-09-22 PROCEDURE — 85027 COMPLETE CBC AUTOMATED: CPT

## 2023-09-22 RX ORDER — IRON SUCROSE 20 MG/ML
200 INJECTION, SOLUTION INTRAVENOUS ONCE
Refills: 0 | Status: COMPLETED | OUTPATIENT
Start: 2023-09-22 | End: 2023-09-22

## 2023-09-22 RX ADMIN — IRON SUCROSE 110 MILLIGRAM(S): 20 INJECTION, SOLUTION INTRAVENOUS at 13:02

## 2023-09-23 LAB
HCT VFR BLD CALC: 31.3 %
HGB BLD-MCNC: 8.9 G/DL
MCHC RBC-ENTMCNC: 24.1 PG
MCHC RBC-ENTMCNC: 28.4 G/DL
MCV RBC AUTO: 84.8 FL
PLATELET # BLD AUTO: 199 K/UL
PMV BLD: 10.4 FL
RBC # BLD: 3.69 M/UL
RBC # FLD: 22.6 %
WBC # FLD AUTO: 8.15 K/UL

## 2023-09-29 ENCOUNTER — OUTPATIENT (OUTPATIENT)
Dept: OUTPATIENT SERVICES | Facility: HOSPITAL | Age: 78
LOS: 1 days | End: 2023-09-29
Payer: MEDICARE

## 2023-09-29 ENCOUNTER — LABORATORY RESULT (OUTPATIENT)
Age: 78
End: 2023-09-29

## 2023-09-29 ENCOUNTER — APPOINTMENT (OUTPATIENT)
Dept: HEMATOLOGY ONCOLOGY | Facility: CLINIC | Age: 78
End: 2023-09-29
Payer: MEDICARE

## 2023-09-29 DIAGNOSIS — E61.1 IRON DEFICIENCY: ICD-10-CM

## 2023-09-29 PROCEDURE — 99213 OFFICE O/P EST LOW 20 MIN: CPT

## 2023-09-29 PROCEDURE — 85027 COMPLETE CBC AUTOMATED: CPT

## 2023-10-02 LAB
HCT VFR BLD CALC: 34.1 %
HGB BLD-MCNC: 9.7 G/DL
MCHC RBC-ENTMCNC: 24.9 PG
MCHC RBC-ENTMCNC: 28.4 G/DL
MCV RBC AUTO: 87.4 FL
PLATELET # BLD AUTO: 205 K/UL
PMV BLD: 10.9 FL
RBC # BLD: 3.9 M/UL
RBC # FLD: 24.9 %
WBC # FLD AUTO: 9.32 K/UL

## 2023-10-16 ENCOUNTER — RX RENEWAL (OUTPATIENT)
Age: 78
End: 2023-10-16

## 2023-10-18 ENCOUNTER — OUTPATIENT (OUTPATIENT)
Dept: OUTPATIENT SERVICES | Facility: HOSPITAL | Age: 78
LOS: 1 days | End: 2023-10-18
Payer: MEDICARE

## 2023-10-18 ENCOUNTER — APPOINTMENT (OUTPATIENT)
Dept: HEMATOLOGY ONCOLOGY | Facility: CLINIC | Age: 78
End: 2023-10-18

## 2023-10-18 ENCOUNTER — LABORATORY RESULT (OUTPATIENT)
Age: 78
End: 2023-10-18

## 2023-10-18 DIAGNOSIS — E61.1 IRON DEFICIENCY: ICD-10-CM

## 2023-10-18 LAB
HCT VFR BLD CALC: 34.4 %
HGB BLD-MCNC: 10.2 G/DL
IRON SATN MFR SERPL: 13 %
IRON SERPL-MCNC: 41 UG/DL
MCHC RBC-ENTMCNC: 26.6 PG
MCHC RBC-ENTMCNC: 29.7 G/DL
MCV RBC AUTO: 89.6 FL
PLATELET # BLD AUTO: 191 K/UL
PMV BLD: 9.4 FL
RBC # BLD: 3.84 M/UL
RBC # FLD: 22.8 %
TIBC SERPL-MCNC: 310 UG/DL
UIBC SERPL-MCNC: 269 UG/DL
WBC # FLD AUTO: 9.4 K/UL

## 2023-10-18 PROCEDURE — 36415 COLL VENOUS BLD VENIPUNCTURE: CPT

## 2023-10-18 PROCEDURE — 82728 ASSAY OF FERRITIN: CPT

## 2023-10-18 PROCEDURE — 83540 ASSAY OF IRON: CPT

## 2023-10-18 PROCEDURE — 85027 COMPLETE CBC AUTOMATED: CPT

## 2023-10-18 PROCEDURE — 83550 IRON BINDING TEST: CPT

## 2023-10-19 ENCOUNTER — OUTPATIENT (OUTPATIENT)
Dept: OUTPATIENT SERVICES | Facility: HOSPITAL | Age: 78
LOS: 1 days | End: 2023-10-19
Payer: MEDICARE

## 2023-10-19 ENCOUNTER — APPOINTMENT (OUTPATIENT)
Dept: HEMATOLOGY ONCOLOGY | Facility: CLINIC | Age: 78
End: 2023-10-19
Payer: MEDICARE

## 2023-10-19 VITALS
DIASTOLIC BLOOD PRESSURE: 72 MMHG | HEART RATE: 69 BPM | SYSTOLIC BLOOD PRESSURE: 161 MMHG | HEIGHT: 66 IN | RESPIRATION RATE: 16 BRPM | BODY MASS INDEX: 24.75 KG/M2 | TEMPERATURE: 96.7 F | WEIGHT: 154 LBS

## 2023-10-19 DIAGNOSIS — E61.1 IRON DEFICIENCY: ICD-10-CM

## 2023-10-19 LAB — FERRITIN SERPL-MCNC: 78 NG/ML

## 2023-10-19 PROCEDURE — 99213 OFFICE O/P EST LOW 20 MIN: CPT

## 2023-10-22 ENCOUNTER — RX RENEWAL (OUTPATIENT)
Age: 78
End: 2023-10-22

## 2023-10-22 RX ORDER — FERROUS SULFATE TAB EC 324 MG (65 MG FE EQUIVALENT) 324 (65 FE) MG
324 (65 FE) TABLET DELAYED RESPONSE ORAL EVERY OTHER DAY
Qty: 15 | Refills: 1 | Status: ACTIVE | COMMUNITY
Start: 2023-08-29 | End: 1900-01-01

## 2023-10-24 ENCOUNTER — APPOINTMENT (OUTPATIENT)
Dept: INFUSION THERAPY | Facility: CLINIC | Age: 78
End: 2023-10-24

## 2023-10-24 ENCOUNTER — OUTPATIENT (OUTPATIENT)
Dept: OUTPATIENT SERVICES | Facility: HOSPITAL | Age: 78
LOS: 1 days | End: 2023-10-24
Payer: MEDICARE

## 2023-10-24 DIAGNOSIS — E61.1 IRON DEFICIENCY: ICD-10-CM

## 2023-10-24 PROCEDURE — 96365 THER/PROPH/DIAG IV INF INIT: CPT

## 2023-10-24 RX ORDER — IRON SUCROSE 20 MG/ML
200 INJECTION, SOLUTION INTRAVENOUS ONCE
Refills: 0 | Status: COMPLETED | OUTPATIENT
Start: 2023-10-24 | End: 2023-10-24

## 2023-10-24 RX ADMIN — IRON SUCROSE 220 MILLIGRAM(S): 20 INJECTION, SOLUTION INTRAVENOUS at 15:27

## 2023-11-09 ENCOUNTER — RX RENEWAL (OUTPATIENT)
Age: 78
End: 2023-11-09

## 2023-11-09 RX ORDER — TAMSULOSIN HYDROCHLORIDE 0.4 MG/1
0.4 CAPSULE ORAL
Qty: 90 | Refills: 3 | Status: ACTIVE | COMMUNITY
Start: 2021-07-19 | End: 1900-01-01

## 2023-11-09 RX ORDER — ATORVASTATIN CALCIUM 10 MG/1
10 TABLET, FILM COATED ORAL
Qty: 90 | Refills: 3 | Status: ACTIVE | COMMUNITY
Start: 2022-12-02 | End: 1900-01-01

## 2023-11-09 RX ORDER — METOPROLOL SUCCINATE 50 MG/1
50 TABLET, EXTENDED RELEASE ORAL DAILY
Qty: 90 | Refills: 3 | Status: ACTIVE | COMMUNITY
Start: 2021-09-15 | End: 1900-01-01

## 2023-11-15 ENCOUNTER — APPOINTMENT (OUTPATIENT)
Dept: CARDIOLOGY | Facility: CLINIC | Age: 78
End: 2023-11-15

## 2023-12-05 ENCOUNTER — LABORATORY RESULT (OUTPATIENT)
Age: 78
End: 2023-12-05

## 2023-12-05 ENCOUNTER — APPOINTMENT (OUTPATIENT)
Dept: HEMATOLOGY ONCOLOGY | Facility: CLINIC | Age: 78
End: 2023-12-05

## 2023-12-05 ENCOUNTER — OUTPATIENT (OUTPATIENT)
Dept: OUTPATIENT SERVICES | Facility: HOSPITAL | Age: 78
LOS: 1 days | End: 2023-12-05
Payer: MEDICARE

## 2023-12-05 DIAGNOSIS — E61.1 IRON DEFICIENCY: ICD-10-CM

## 2023-12-05 LAB
HCT VFR BLD CALC: 33 %
HGB BLD-MCNC: 10.6 G/DL
MCHC RBC-ENTMCNC: 28 PG
MCHC RBC-ENTMCNC: 32.1 G/DL
MCV RBC AUTO: 87.3 FL
PLATELET # BLD AUTO: 192 K/UL
PMV BLD: 9.4 FL
RBC # BLD: 3.78 M/UL
RBC # FLD: 17.8 %
WBC # FLD AUTO: 9.11 K/UL

## 2023-12-05 PROCEDURE — 36416 COLLJ CAPILLARY BLOOD SPEC: CPT

## 2023-12-05 PROCEDURE — 85027 COMPLETE CBC AUTOMATED: CPT

## 2023-12-05 PROCEDURE — 82728 ASSAY OF FERRITIN: CPT

## 2023-12-06 DIAGNOSIS — E61.1 IRON DEFICIENCY: ICD-10-CM

## 2023-12-06 LAB — FERRITIN SERPL-MCNC: 35 NG/ML

## 2023-12-18 ENCOUNTER — APPOINTMENT (OUTPATIENT)
Dept: INFUSION THERAPY | Facility: CLINIC | Age: 78
End: 2023-12-18

## 2023-12-18 ENCOUNTER — OUTPATIENT (OUTPATIENT)
Dept: OUTPATIENT SERVICES | Facility: HOSPITAL | Age: 78
LOS: 1 days | End: 2023-12-18
Payer: MEDICARE

## 2023-12-18 DIAGNOSIS — E61.1 IRON DEFICIENCY: ICD-10-CM

## 2023-12-18 PROCEDURE — 96365 THER/PROPH/DIAG IV INF INIT: CPT

## 2023-12-18 RX ORDER — IRON SUCROSE 20 MG/ML
200 INJECTION, SOLUTION INTRAVENOUS ONCE
Refills: 0 | Status: COMPLETED | OUTPATIENT
Start: 2023-12-18 | End: 2023-12-18

## 2023-12-18 RX ADMIN — IRON SUCROSE 220 MILLIGRAM(S): 20 INJECTION, SOLUTION INTRAVENOUS at 15:45

## 2023-12-18 RX ADMIN — IRON SUCROSE 200 MILLIGRAM(S): 20 INJECTION, SOLUTION INTRAVENOUS at 16:15

## 2023-12-20 ENCOUNTER — RX RENEWAL (OUTPATIENT)
Age: 78
End: 2023-12-20

## 2023-12-20 RX ORDER — FUROSEMIDE 20 MG/1
20 TABLET ORAL DAILY
Qty: 90 | Refills: 3 | Status: ACTIVE | COMMUNITY
Start: 2022-12-22 | End: 1900-01-01

## 2023-12-27 ENCOUNTER — APPOINTMENT (OUTPATIENT)
Dept: INFUSION THERAPY | Facility: CLINIC | Age: 78
End: 2023-12-27

## 2023-12-27 ENCOUNTER — OUTPATIENT (OUTPATIENT)
Dept: OUTPATIENT SERVICES | Facility: HOSPITAL | Age: 78
LOS: 1 days | End: 2023-12-27
Payer: MEDICARE

## 2023-12-27 VITALS — HEART RATE: 42 BPM | SYSTOLIC BLOOD PRESSURE: 124 MMHG | TEMPERATURE: 97 F | DIASTOLIC BLOOD PRESSURE: 55 MMHG

## 2023-12-27 DIAGNOSIS — E61.1 IRON DEFICIENCY: ICD-10-CM

## 2023-12-27 PROCEDURE — 96365 THER/PROPH/DIAG IV INF INIT: CPT

## 2023-12-27 RX ORDER — IRON SUCROSE 20 MG/ML
200 INJECTION, SOLUTION INTRAVENOUS ONCE
Refills: 0 | Status: COMPLETED | OUTPATIENT
Start: 2023-12-27 | End: 2023-12-27

## 2023-12-27 RX ADMIN — IRON SUCROSE 200 MILLIGRAM(S): 20 INJECTION, SOLUTION INTRAVENOUS at 16:20

## 2023-12-27 RX ADMIN — IRON SUCROSE 220 MILLIGRAM(S): 20 INJECTION, SOLUTION INTRAVENOUS at 15:50

## 2024-01-10 ENCOUNTER — OUTPATIENT (OUTPATIENT)
Dept: OUTPATIENT SERVICES | Facility: HOSPITAL | Age: 79
LOS: 1 days | End: 2024-01-10
Payer: MEDICARE

## 2024-01-10 ENCOUNTER — APPOINTMENT (OUTPATIENT)
Dept: INFUSION THERAPY | Facility: CLINIC | Age: 79
End: 2024-01-10

## 2024-01-10 DIAGNOSIS — E61.1 IRON DEFICIENCY: ICD-10-CM

## 2024-01-10 PROCEDURE — 96365 THER/PROPH/DIAG IV INF INIT: CPT

## 2024-01-10 RX ORDER — IRON SUCROSE 20 MG/ML
200 INJECTION, SOLUTION INTRAVENOUS ONCE
Refills: 0 | Status: COMPLETED | OUTPATIENT
Start: 2024-01-10 | End: 2024-01-10

## 2024-01-10 RX ADMIN — IRON SUCROSE 220 MILLIGRAM(S): 20 INJECTION, SOLUTION INTRAVENOUS at 15:20

## 2024-01-10 RX ADMIN — IRON SUCROSE 200 MILLIGRAM(S): 20 INJECTION, SOLUTION INTRAVENOUS at 15:50

## 2024-01-11 DIAGNOSIS — E61.1 IRON DEFICIENCY: ICD-10-CM

## 2024-01-18 ENCOUNTER — OUTPATIENT (OUTPATIENT)
Dept: OUTPATIENT SERVICES | Facility: HOSPITAL | Age: 79
LOS: 1 days | End: 2024-01-18
Payer: MEDICARE

## 2024-01-18 ENCOUNTER — APPOINTMENT (OUTPATIENT)
Dept: INFUSION THERAPY | Facility: CLINIC | Age: 79
End: 2024-01-18
Payer: MEDICARE

## 2024-01-18 ENCOUNTER — APPOINTMENT (OUTPATIENT)
Dept: HEMATOLOGY ONCOLOGY | Facility: CLINIC | Age: 79
End: 2024-01-18
Payer: MEDICARE

## 2024-01-18 ENCOUNTER — LABORATORY RESULT (OUTPATIENT)
Age: 79
End: 2024-01-18

## 2024-01-18 VITALS
SYSTOLIC BLOOD PRESSURE: 142 MMHG | HEART RATE: 51 BPM | WEIGHT: 158 LBS | TEMPERATURE: 97.3 F | BODY MASS INDEX: 25.39 KG/M2 | DIASTOLIC BLOOD PRESSURE: 60 MMHG | HEIGHT: 66 IN

## 2024-01-18 DIAGNOSIS — E61.1 IRON DEFICIENCY: ICD-10-CM

## 2024-01-18 LAB
HCT VFR BLD CALC: 34.9 %
HGB BLD-MCNC: 11 G/DL
MCHC RBC-ENTMCNC: 28.7 PG
MCHC RBC-ENTMCNC: 31.5 G/DL
MCV RBC AUTO: 91.1 FL
PLATELET # BLD AUTO: 156 K/UL
PMV BLD: 10 FL
RBC # BLD: 3.83 M/UL
RBC # FLD: 16.5 %
WBC # FLD AUTO: 9.69 K/UL

## 2024-01-18 PROCEDURE — 99213 OFFICE O/P EST LOW 20 MIN: CPT

## 2024-01-18 PROCEDURE — 96365 THER/PROPH/DIAG IV INF INIT: CPT

## 2024-01-18 PROCEDURE — G2211 COMPLEX E/M VISIT ADD ON: CPT

## 2024-01-18 PROCEDURE — 85027 COMPLETE CBC AUTOMATED: CPT

## 2024-01-18 PROCEDURE — 99213 OFFICE O/P EST LOW 20 MIN: CPT | Mod: 25

## 2024-01-18 RX ORDER — IRON SUCROSE 20 MG/ML
200 INJECTION, SOLUTION INTRAVENOUS ONCE
Refills: 0 | Status: COMPLETED | OUTPATIENT
Start: 2024-01-18 | End: 2024-01-18

## 2024-01-18 RX ADMIN — IRON SUCROSE 220 MILLIGRAM(S): 20 INJECTION, SOLUTION INTRAVENOUS at 15:35

## 2024-01-18 RX ADMIN — IRON SUCROSE 200 MILLIGRAM(S): 20 INJECTION, SOLUTION INTRAVENOUS at 16:05

## 2024-01-19 NOTE — ASSESSMENT
[FreeTextEntry1] : 78 year old male   #)Iron deficiency anemia -S/P Venofer x5 in 9/2023 and one unit PRBC on 9/15 -S/P colonoscopy which reportedly showed internal hemorrhoids, polyps, and bleeding vessels (cauterized)  -Patient refusing EGD  #Hypothyroidism -TSH was elevated with low FT4, and was started on Synthroid 25mcg, now TFTs stable.  PLAN:  --CBC today reviewed, Hgb improved further to 11.0 --Will proceed with final Venofer dose today (delayed due to recent covid infection). Also check ferritin today --Advised to follow up with GI as scheduled.  --Monitor CBC, transfuse if Hgb <7 g/dL  RTC in 2 months with CBC, ferritin   Patient was seen and examined and discussed with Dr. Zhang who agrees to the above plan of care.

## 2024-01-19 NOTE — HISTORY OF PRESENT ILLNESS
[de-identified] : THOMAS COPPOLA is 77 year old male , referred from [facility] for Low HGB and Ferritin level. Patient reports he experience SOB on exertion, decreased exercise tolerance, fatigue. NO CP. He denied rectal bleeding , he didn't have Colonoscopy from 15 yeas ago.  Past medical History of : Bladder Cancer get treated by DR. Finley in [facility] In [place].  Small Cell Carcinoma of the skin, treated by Dr. CHRIS Gutierrez in [place].   He has COPD on Home O2 intermittently , And Nebulizer, currently he is still smoking.   He has Coronary Artery Disease and A FIB on Aspirin and Plavix.   His daughter states that he looks more short of breath when he does any activity. He has oxygen at home.as he desaturates during exercise but does not like to use it.     [de-identified] : 5/16/2023 Patient returns after venofer . He continues to complain of fatigue , feels wiped out at end of the day . no hematochezia.  8/29/23: Pt returns for follow up. He is s/p Venofer x5 in 4/2023. He reports fatigue, no other symptoms. He denied any bleeding. He was started on synthroid 25 mcg for hypothyroidism. He continues to follow with dermatology for scc of nose. He has not had colonoscopy in the past 15 years.  10/19/2023: Patient returns for follow up, s/p Venofer x 5 in September 2023. He had a colonoscopy done with Dr. Allen which showed internal hemorrhoids and a single poly (that was removed). As per patient's daughter, there were also vessels that were cauterized. He currently takes ASA and Plavix. It was recommended to also have an EGD, however he is refusing. He feels overall well, no new complaints. He reports occasional blood in his stool.  1/18/2024: Patient returns for follow up for iron deficiency anemia, still currently on ASA and Plavix. He feels overall well and is recovering from a recent bout of covid. He feels well now and has no new complaints.

## 2024-01-22 ENCOUNTER — RX RENEWAL (OUTPATIENT)
Age: 79
End: 2024-01-22

## 2024-01-22 RX ORDER — LEVOTHYROXINE SODIUM 0.09 MG/1
88 TABLET ORAL
Qty: 90 | Refills: 3 | Status: ACTIVE | COMMUNITY
Start: 2021-12-01 | End: 1900-01-01

## 2024-01-23 ENCOUNTER — APPOINTMENT (OUTPATIENT)
Dept: INFUSION THERAPY | Facility: CLINIC | Age: 79
End: 2024-01-23

## 2024-01-24 ENCOUNTER — APPOINTMENT (OUTPATIENT)
Dept: INFUSION THERAPY | Facility: CLINIC | Age: 79
End: 2024-01-24

## 2024-01-25 RX ORDER — POTASSIUM CHLORIDE 750 MG/1
10 TABLET, FILM COATED, EXTENDED RELEASE ORAL
Qty: 90 | Refills: 3 | Status: ACTIVE | COMMUNITY
Start: 2021-09-17 | End: 1900-01-01

## 2024-01-30 ENCOUNTER — INPATIENT (INPATIENT)
Facility: HOSPITAL | Age: 79
LOS: 4 days | Discharge: HOME | DRG: 378 | End: 2024-02-04
Attending: HOSPITALIST | Admitting: FAMILY MEDICINE
Payer: MEDICARE

## 2024-01-30 VITALS
RESPIRATION RATE: 18 BRPM | HEART RATE: 87 BPM | WEIGHT: 158.07 LBS | HEIGHT: 68 IN | OXYGEN SATURATION: 98 % | TEMPERATURE: 97 F | SYSTOLIC BLOOD PRESSURE: 126 MMHG | DIASTOLIC BLOOD PRESSURE: 62 MMHG

## 2024-01-30 DIAGNOSIS — K92.2 GASTROINTESTINAL HEMORRHAGE, UNSPECIFIED: ICD-10-CM

## 2024-01-30 LAB
ALBUMIN SERPL ELPH-MCNC: 4.1 G/DL — SIGNIFICANT CHANGE UP (ref 3.5–5.2)
ALP SERPL-CCNC: 79 U/L — SIGNIFICANT CHANGE UP (ref 30–115)
ALT FLD-CCNC: 7 U/L — SIGNIFICANT CHANGE UP (ref 0–41)
ANION GAP SERPL CALC-SCNC: 10 MMOL/L — SIGNIFICANT CHANGE UP (ref 7–14)
AST SERPL-CCNC: 13 U/L — SIGNIFICANT CHANGE UP (ref 0–41)
BASE EXCESS BLDV CALC-SCNC: 0.3 MMOL/L — SIGNIFICANT CHANGE UP (ref -2–3)
BASOPHILS # BLD AUTO: 0.05 K/UL — SIGNIFICANT CHANGE UP (ref 0–0.2)
BASOPHILS NFR BLD AUTO: 0.6 % — SIGNIFICANT CHANGE UP (ref 0–1)
BILIRUB SERPL-MCNC: 0.5 MG/DL — SIGNIFICANT CHANGE UP (ref 0.2–1.2)
BUN SERPL-MCNC: 35 MG/DL — HIGH (ref 10–20)
CA-I SERPL-SCNC: 1.21 MMOL/L — SIGNIFICANT CHANGE UP (ref 1.15–1.33)
CALCIUM SERPL-MCNC: 9.4 MG/DL — SIGNIFICANT CHANGE UP (ref 8.4–10.5)
CHLORIDE SERPL-SCNC: 102 MMOL/L — SIGNIFICANT CHANGE UP (ref 98–110)
CO2 SERPL-SCNC: 25 MMOL/L — SIGNIFICANT CHANGE UP (ref 17–32)
CREAT SERPL-MCNC: 1.4 MG/DL — SIGNIFICANT CHANGE UP (ref 0.7–1.5)
EGFR: 51 ML/MIN/1.73M2 — LOW
EOSINOPHIL # BLD AUTO: 0.11 K/UL — SIGNIFICANT CHANGE UP (ref 0–0.7)
EOSINOPHIL NFR BLD AUTO: 1.3 % — SIGNIFICANT CHANGE UP (ref 0–8)
GAS PNL BLDV: 136 MMOL/L — SIGNIFICANT CHANGE UP (ref 136–145)
GAS PNL BLDV: SIGNIFICANT CHANGE UP
GLUCOSE SERPL-MCNC: 130 MG/DL — HIGH (ref 70–99)
HCO3 BLDV-SCNC: 27 MMOL/L — SIGNIFICANT CHANGE UP (ref 22–29)
HCT VFR BLD CALC: 26.2 % — LOW (ref 42–52)
HCT VFR BLD CALC: 28.9 % — LOW (ref 42–52)
HCT VFR BLDA CALC: 29 % — LOW (ref 39–51)
HGB BLD CALC-MCNC: 9.5 G/DL — LOW (ref 12.6–17.4)
HGB BLD-MCNC: 8.3 G/DL — LOW (ref 14–18)
HGB BLD-MCNC: 9.1 G/DL — LOW (ref 14–18)
IMM GRANULOCYTES NFR BLD AUTO: 0.6 % — HIGH (ref 0.1–0.3)
LACTATE BLDV-MCNC: 1.6 MMOL/L — SIGNIFICANT CHANGE UP (ref 0.5–2)
LYMPHOCYTES # BLD AUTO: 2.63 K/UL — SIGNIFICANT CHANGE UP (ref 1.2–3.4)
LYMPHOCYTES # BLD AUTO: 30.6 % — SIGNIFICANT CHANGE UP (ref 20.5–51.1)
MCHC RBC-ENTMCNC: 29.4 PG — SIGNIFICANT CHANGE UP (ref 27–31)
MCHC RBC-ENTMCNC: 29.5 PG — SIGNIFICANT CHANGE UP (ref 27–31)
MCHC RBC-ENTMCNC: 31.5 G/DL — LOW (ref 32–37)
MCHC RBC-ENTMCNC: 31.7 G/DL — LOW (ref 32–37)
MCV RBC AUTO: 93.2 FL — SIGNIFICANT CHANGE UP (ref 80–94)
MCV RBC AUTO: 93.5 FL — SIGNIFICANT CHANGE UP (ref 80–94)
MONOCYTES # BLD AUTO: 0.66 K/UL — HIGH (ref 0.1–0.6)
MONOCYTES NFR BLD AUTO: 7.7 % — SIGNIFICANT CHANGE UP (ref 1.7–9.3)
NEUTROPHILS # BLD AUTO: 5.1 K/UL — SIGNIFICANT CHANGE UP (ref 1.4–6.5)
NEUTROPHILS NFR BLD AUTO: 59.2 % — SIGNIFICANT CHANGE UP (ref 42.2–75.2)
NRBC # BLD: 0 /100 WBCS — SIGNIFICANT CHANGE UP (ref 0–0)
NRBC # BLD: 0 /100 WBCS — SIGNIFICANT CHANGE UP (ref 0–0)
NT-PROBNP SERPL-SCNC: 303 PG/ML — HIGH (ref 0–300)
PCO2 BLDV: 52 MMHG — SIGNIFICANT CHANGE UP (ref 42–55)
PH BLDV: 7.32 — SIGNIFICANT CHANGE UP (ref 7.32–7.43)
PLATELET # BLD AUTO: 217 K/UL — SIGNIFICANT CHANGE UP (ref 130–400)
PLATELET # BLD AUTO: 253 K/UL — SIGNIFICANT CHANGE UP (ref 130–400)
PMV BLD: 10 FL — SIGNIFICANT CHANGE UP (ref 7.4–10.4)
PMV BLD: 10 FL — SIGNIFICANT CHANGE UP (ref 7.4–10.4)
PO2 BLDV: 23 MMHG — LOW (ref 25–45)
POTASSIUM BLDV-SCNC: 4.2 MMOL/L — SIGNIFICANT CHANGE UP (ref 3.5–5.1)
POTASSIUM SERPL-MCNC: 4.4 MMOL/L — SIGNIFICANT CHANGE UP (ref 3.5–5)
POTASSIUM SERPL-SCNC: 4.4 MMOL/L — SIGNIFICANT CHANGE UP (ref 3.5–5)
PROT SERPL-MCNC: 6.8 G/DL — SIGNIFICANT CHANGE UP (ref 6–8)
RBC # BLD: 2.81 M/UL — LOW (ref 4.7–6.1)
RBC # BLD: 3.09 M/UL — LOW (ref 4.7–6.1)
RBC # FLD: 16 % — HIGH (ref 11.5–14.5)
RBC # FLD: 16.1 % — HIGH (ref 11.5–14.5)
SAO2 % BLDV: 28.5 % — LOW (ref 67–88)
SODIUM SERPL-SCNC: 137 MMOL/L — SIGNIFICANT CHANGE UP (ref 135–146)
TROPONIN SAMPLING TIME: SIGNIFICANT CHANGE UP
TROPONIN T, HIGH SENSITIVITY RESULT: 30 NG/L — HIGH (ref 6–21)
TROPONIN T, HIGH SENSITIVITY RESULT: 32 NG/L — HIGH (ref 6–21)
TROPONIN T, HIGH SENSITIVITY RESULT: 42 NG/L — HIGH (ref 6–21)
WBC # BLD: 7.32 K/UL — SIGNIFICANT CHANGE UP (ref 4.8–10.8)
WBC # BLD: 8.6 K/UL — SIGNIFICANT CHANGE UP (ref 4.8–10.8)
WBC # FLD AUTO: 7.32 K/UL — SIGNIFICANT CHANGE UP (ref 4.8–10.8)
WBC # FLD AUTO: 8.6 K/UL — SIGNIFICANT CHANGE UP (ref 4.8–10.8)

## 2024-01-30 PROCEDURE — C1889: CPT

## 2024-01-30 PROCEDURE — 0225U NFCT DS DNA&RNA 21 SARSCOV2: CPT

## 2024-01-30 PROCEDURE — 36430 TRANSFUSION BLD/BLD COMPNT: CPT

## 2024-01-30 PROCEDURE — 94640 AIRWAY INHALATION TREATMENT: CPT

## 2024-01-30 PROCEDURE — P9040: CPT

## 2024-01-30 PROCEDURE — 83540 ASSAY OF IRON: CPT

## 2024-01-30 PROCEDURE — 85018 HEMOGLOBIN: CPT

## 2024-01-30 PROCEDURE — 86900 BLOOD TYPING SEROLOGIC ABO: CPT

## 2024-01-30 PROCEDURE — 83550 IRON BINDING TEST: CPT

## 2024-01-30 PROCEDURE — 99285 EMERGENCY DEPT VISIT HI MDM: CPT

## 2024-01-30 PROCEDURE — 85027 COMPLETE CBC AUTOMATED: CPT

## 2024-01-30 PROCEDURE — 82728 ASSAY OF FERRITIN: CPT

## 2024-01-30 PROCEDURE — 85730 THROMBOPLASTIN TIME PARTIAL: CPT

## 2024-01-30 PROCEDURE — 80048 BASIC METABOLIC PNL TOTAL CA: CPT

## 2024-01-30 PROCEDURE — 93306 TTE W/DOPPLER COMPLETE: CPT

## 2024-01-30 PROCEDURE — 85014 HEMATOCRIT: CPT

## 2024-01-30 PROCEDURE — 84484 ASSAY OF TROPONIN QUANT: CPT

## 2024-01-30 PROCEDURE — 71045 X-RAY EXAM CHEST 1 VIEW: CPT | Mod: 26

## 2024-01-30 PROCEDURE — 85610 PROTHROMBIN TIME: CPT

## 2024-01-30 PROCEDURE — 86923 COMPATIBILITY TEST ELECTRIC: CPT

## 2024-01-30 PROCEDURE — 36415 COLL VENOUS BLD VENIPUNCTURE: CPT

## 2024-01-30 PROCEDURE — 80053 COMPREHEN METABOLIC PANEL: CPT

## 2024-01-30 PROCEDURE — 86901 BLOOD TYPING SEROLOGIC RH(D): CPT

## 2024-01-30 PROCEDURE — C9113: CPT

## 2024-01-30 PROCEDURE — 99223 1ST HOSP IP/OBS HIGH 75: CPT

## 2024-01-30 PROCEDURE — 86850 RBC ANTIBODY SCREEN: CPT

## 2024-01-30 RX ORDER — ALPRAZOLAM 0.25 MG
0.5 TABLET ORAL
Refills: 0 | Status: DISCONTINUED | OUTPATIENT
Start: 2024-01-30 | End: 2024-02-02

## 2024-01-30 RX ORDER — BUDESONIDE AND FORMOTEROL FUMARATE DIHYDRATE 160; 4.5 UG/1; UG/1
2 AEROSOL RESPIRATORY (INHALATION)
Refills: 0 | Status: DISCONTINUED | OUTPATIENT
Start: 2024-01-30 | End: 2024-02-02

## 2024-01-30 RX ORDER — NICOTINE POLACRILEX 2 MG
1 GUM BUCCAL DAILY
Refills: 0 | Status: DISCONTINUED | OUTPATIENT
Start: 2024-01-30 | End: 2024-02-02

## 2024-01-30 RX ORDER — ATORVASTATIN CALCIUM 80 MG/1
10 TABLET, FILM COATED ORAL AT BEDTIME
Refills: 0 | Status: DISCONTINUED | OUTPATIENT
Start: 2024-01-30 | End: 2024-02-02

## 2024-01-30 RX ORDER — LEVOTHYROXINE SODIUM 125 MCG
88 TABLET ORAL DAILY
Refills: 0 | Status: DISCONTINUED | OUTPATIENT
Start: 2024-01-30 | End: 2024-02-02

## 2024-01-30 RX ORDER — FUROSEMIDE 40 MG
20 TABLET ORAL DAILY
Refills: 0 | Status: DISCONTINUED | OUTPATIENT
Start: 2024-01-30 | End: 2024-02-02

## 2024-01-30 RX ORDER — PANTOPRAZOLE SODIUM 20 MG/1
40 TABLET, DELAYED RELEASE ORAL
Refills: 0 | Status: DISCONTINUED | OUTPATIENT
Start: 2024-01-30 | End: 2024-02-02

## 2024-01-30 RX ORDER — PANTOPRAZOLE SODIUM 20 MG/1
40 TABLET, DELAYED RELEASE ORAL ONCE
Refills: 0 | Status: COMPLETED | OUTPATIENT
Start: 2024-01-30 | End: 2024-01-30

## 2024-01-30 RX ORDER — TAMSULOSIN HYDROCHLORIDE 0.4 MG/1
0.4 CAPSULE ORAL AT BEDTIME
Refills: 0 | Status: DISCONTINUED | OUTPATIENT
Start: 2024-01-30 | End: 2024-02-02

## 2024-01-30 RX ORDER — ASPIRIN/CALCIUM CARB/MAGNESIUM 324 MG
81 TABLET ORAL DAILY
Refills: 0 | Status: DISCONTINUED | OUTPATIENT
Start: 2024-01-30 | End: 2024-02-02

## 2024-01-30 RX ORDER — ALPRAZOLAM 0.25 MG
1 TABLET ORAL
Refills: 0 | DISCHARGE

## 2024-01-30 RX ORDER — ALBUTEROL 90 UG/1
2 AEROSOL, METERED ORAL EVERY 4 HOURS
Refills: 0 | Status: DISCONTINUED | OUTPATIENT
Start: 2024-01-30 | End: 2024-02-02

## 2024-01-30 RX ORDER — AMIODARONE HYDROCHLORIDE 400 MG/1
200 TABLET ORAL DAILY
Refills: 0 | Status: DISCONTINUED | OUTPATIENT
Start: 2024-01-30 | End: 2024-02-02

## 2024-01-30 RX ORDER — ACETAMINOPHEN 500 MG
650 TABLET ORAL EVERY 6 HOURS
Refills: 0 | Status: DISCONTINUED | OUTPATIENT
Start: 2024-01-30 | End: 2024-02-02

## 2024-01-30 RX ORDER — ONDANSETRON 8 MG/1
4 TABLET, FILM COATED ORAL EVERY 6 HOURS
Refills: 0 | Status: DISCONTINUED | OUTPATIENT
Start: 2024-01-30 | End: 2024-02-02

## 2024-01-30 RX ORDER — IPRATROPIUM/ALBUTEROL SULFATE 18-103MCG
3 AEROSOL WITH ADAPTER (GRAM) INHALATION ONCE
Refills: 0 | Status: COMPLETED | OUTPATIENT
Start: 2024-01-30 | End: 2024-01-30

## 2024-01-30 RX ORDER — METOPROLOL TARTRATE 50 MG
50 TABLET ORAL DAILY
Refills: 0 | Status: DISCONTINUED | OUTPATIENT
Start: 2024-01-30 | End: 2024-02-01

## 2024-01-30 RX ORDER — FLUTICASONE PROPIONATE AND SALMETEROL 50; 250 UG/1; UG/1
1 POWDER ORAL; RESPIRATORY (INHALATION)
Refills: 0 | DISCHARGE

## 2024-01-30 RX ORDER — SODIUM CHLORIDE 9 MG/ML
1000 INJECTION INTRAMUSCULAR; INTRAVENOUS; SUBCUTANEOUS
Refills: 0 | Status: DISCONTINUED | OUTPATIENT
Start: 2024-01-30 | End: 2024-02-04

## 2024-01-30 RX ORDER — IPRATROPIUM/ALBUTEROL SULFATE 18-103MCG
3 AEROSOL WITH ADAPTER (GRAM) INHALATION EVERY 6 HOURS
Refills: 0 | Status: DISCONTINUED | OUTPATIENT
Start: 2024-01-30 | End: 2024-02-02

## 2024-01-30 RX ORDER — SENNA PLUS 8.6 MG/1
2 TABLET ORAL AT BEDTIME
Refills: 0 | Status: DISCONTINUED | OUTPATIENT
Start: 2024-01-30 | End: 2024-02-02

## 2024-01-30 RX ADMIN — TAMSULOSIN HYDROCHLORIDE 0.4 MILLIGRAM(S): 0.4 CAPSULE ORAL at 22:25

## 2024-01-30 RX ADMIN — Medication 3 MILLILITER(S): at 16:50

## 2024-01-30 RX ADMIN — Medication 125 MILLIGRAM(S): at 16:49

## 2024-01-30 RX ADMIN — SODIUM CHLORIDE 50 MILLILITER(S): 9 INJECTION INTRAMUSCULAR; INTRAVENOUS; SUBCUTANEOUS at 22:40

## 2024-01-30 RX ADMIN — PANTOPRAZOLE SODIUM 40 MILLIGRAM(S): 20 TABLET, DELAYED RELEASE ORAL at 18:53

## 2024-01-30 RX ADMIN — ATORVASTATIN CALCIUM 10 MILLIGRAM(S): 80 TABLET, FILM COATED ORAL at 22:26

## 2024-01-30 RX ADMIN — Medication 3 MILLILITER(S): at 16:49

## 2024-01-30 NOTE — ED PROVIDER NOTE - ATTENDING CONTRIBUTION TO CARE
78yM CAD ?afib on aspirin/plavix (not on a/c) p/w rectal bleeding x 5 days, now heavier since yesterday.  Pt reports hx internal hemorrhoid and says he was scoped by GI Dr. Tyler last fall (had polyp removed?).

## 2024-01-30 NOTE — ED PROVIDER NOTE - INTERNATIONAL TRAVEL
No [No Acute Distress] : in no acute distress [Normal] : normal affect and normal mood [de-identified] : nontender, no rebound

## 2024-01-30 NOTE — ED PROVIDER NOTE - CLINICAL SUMMARY MEDICAL DECISION MAKING FREE TEXT BOX
78yM CAD on aspirin/plavix p/w rectal bleeding x 4d, now larger volume.  Pt hemodynamically stable and abd soft/benign.  EKG w/o STEMI.  CXR with bibasilar opacities.  Labs w/ Hgb 9, down 2 points from last week, c/w GIB.  Pt treated with IV PPI and will adm for further care.
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
## 2024-01-30 NOTE — ED ADULT NURSE NOTE - NSFALLUNIVINTERV_ED_ALL_ED
Bed/Stretcher in lowest position, wheels locked, appropriate side rails in place/Call bell, personal items and telephone in reach/Instruct patient to call for assistance before getting out of bed/chair/stretcher/Non-slip footwear applied when patient is off stretcher/[place] to call system/Physically safe environment - no spills, clutter or unnecessary equipment/Purposeful proactive rounding/Room/bathroom lighting operational, light cord in reach

## 2024-01-30 NOTE — H&P ADULT - HISTORY OF PRESENT ILLNESS
Patient is 79 yo male with hx of CHF, atrial fib, HTN, HLD, and copd presenting with dark, and bright red blood per stool that has been going on for the past 2 days with last incidence was this am.  No further bleeding since this am   afebrile Patient also reports Cough, wheezing, and and SOB.  denies any headache, dizziness, blurry vision, CP, palpitation, abdm pain, N/V/D, numbness, or weakness

## 2024-01-30 NOTE — ED PROVIDER NOTE - PROGRESS NOTE DETAILS
pk: labs and imaging reviewed, will obtain repeat trop. Ardara-Blatchford Bleeding Score 11, will admit for further evaluation. review of HIE demonstrates hgb of 11 two weeks ago. rpt troponin pending pk: rpt troponin WNL, pt resting comfortably, vitals stable, admitted for further management of gi bleed

## 2024-01-30 NOTE — H&P ADULT - ASSESSMENT
Patient is 77 yo male with hx of CHF, atrial fib, HTN, HLD, and copd presenting with       #GIB  -Last episode of bleeding this am  -Dark, and bright red blood  -Type and screen stat, NPO, IVF, IV PPI  -Monitor H/H closely  -CTA of the abdm and pelvic if there is active bleeding  -tele monitor   -GI consult   -Patient is on plavix unclear reason, no recent stent.  Hold plavix until further evaluation by cardiology      #Abnormal trop  -EKG shows no acute ischemic changes  -No CP  -Trop level flat  -Will trend trop, TTE, and cardiology consult     #Acute COPD exacerbation  -Will start patient on IV steriod, neb tx, and monitor clinical pic  -Counselled about smoking.  Oxygen   -Nicotine patch   -F/U CXR reading     #HTN/HLD/Anxiety  -Continue with home medications     #Progress Note Handoff  Pending (specify):  as above   Family discussion:  plan of care was discussed with patient and family in details.  all questions were answered.  seems to understand, and in agreement  Disposition:  home

## 2024-01-30 NOTE — ED PROVIDER NOTE - PHYSICAL EXAMINATION
CONSTITUTIONAL: well-appearing, in NAD  SKIN: Warm dry, normal skin turgor  HEAD: NCAT  EYES: EOMI, PERRLA, no scleral icterus, conjunctiva pink  ENT: normal pharynx with no erythema or exudates  NECK: Supple; non tender. Full ROM.  CARD: RRR, no murmurs.  RESP: clear to ausculation b/l. No crackles or wheezing.  ABD: soft, non-tender, non-distended, no rebound or guarding.  GI: maroon stool on KAILASH.   EXT: Full ROM, no bony tenderness, no pedal edema, no calf tenderness  NEURO: normal motor. normal sensory. CN II-XII intact. Cerebellar testing normal. Normal gait.  PSYCH: Cooperative, appropriate.

## 2024-01-30 NOTE — ED PROVIDER NOTE - OBJECTIVE STATEMENT
Patient is an 78-year-old male past medical history of iron deficiency anemia on transfusions, A-fib not on any anticoagulation, high blood pressure, high cholesterol, COPD presenting to ED for evaluation of melanotic stools for the last 4 days and got progressively more maroon in color.  Patient also reports shortness of breath with exertion. Otherwise denies any fever, chills, headache, changes in vision, cough, congestion, cp, palpitations, n/v/d, abd pain, constipation, urinary complaints, lower extremity pain/swelling.

## 2024-01-30 NOTE — H&P ADULT - NSHPPHYSICALEXAM_GEN_ALL_CORE
Vital Signs Last 24 Hrs  T(C): 35.6 (30 Jan 2024 18:49), Max: 36.1 (30 Jan 2024 16:13)  T(F): 96 (30 Jan 2024 18:49), Max: 96.9 (30 Jan 2024 16:13)  HR: 64 (30 Jan 2024 18:49) (64 - 87)  BP: 120/63 (30 Jan 2024 18:49) (120/63 - 126/62)  BP(mean): --  RR: 19 (30 Jan 2024 18:49) (18 - 19)  SpO2: 95% (30 Jan 2024 18:49) (95% - 98%)    Parameters below as of 30 Jan 2024 18:49  Patient On (Oxygen Delivery Method): room air      PHYSICAL EXAM-  GENERAL: NAD,   HEAD:  Atraumatic, Normocephalic  EYES: EOMI, PERRLA, conjunctiva and sclera clear  NECK: Supple, No JVD, Normal thyroid  NERVOUS SYSTEM:  Alert & Oriented X3, Motor Strength 5/5 B/L upper and lower extremities; DTRs 2+ intact and symmetric  CHEST/LUNG: + wheezing with good air entry, no retractions or accessory muscles   HEART: Regular rate and rhythm; No murmurs, rubs, or gallops  ABDOMEN: Soft, Nontender, Nondistended; Bowel sounds present  EXTREMITIES:    No clubbing, cyanosis, or edema  SKIN: No rashes or lesions

## 2024-01-31 LAB
ALBUMIN SERPL ELPH-MCNC: 3.6 G/DL — SIGNIFICANT CHANGE UP (ref 3.5–5.2)
ALP SERPL-CCNC: 67 U/L — SIGNIFICANT CHANGE UP (ref 30–115)
ALT FLD-CCNC: 7 U/L — SIGNIFICANT CHANGE UP (ref 0–41)
ANION GAP SERPL CALC-SCNC: 9 MMOL/L — SIGNIFICANT CHANGE UP (ref 7–14)
AST SERPL-CCNC: 11 U/L — SIGNIFICANT CHANGE UP (ref 0–41)
BILIRUB SERPL-MCNC: 0.5 MG/DL — SIGNIFICANT CHANGE UP (ref 0.2–1.2)
BUN SERPL-MCNC: 27 MG/DL — HIGH (ref 10–20)
CALCIUM SERPL-MCNC: 8.8 MG/DL — SIGNIFICANT CHANGE UP (ref 8.4–10.5)
CHLORIDE SERPL-SCNC: 103 MMOL/L — SIGNIFICANT CHANGE UP (ref 98–110)
CO2 SERPL-SCNC: 25 MMOL/L — SIGNIFICANT CHANGE UP (ref 17–32)
CREAT SERPL-MCNC: 1 MG/DL — SIGNIFICANT CHANGE UP (ref 0.7–1.5)
EGFR: 76 ML/MIN/1.73M2 — SIGNIFICANT CHANGE UP
GLUCOSE SERPL-MCNC: 153 MG/DL — HIGH (ref 70–99)
HCT VFR BLD CALC: 23.8 % — LOW (ref 42–52)
HGB BLD-MCNC: 7.6 G/DL — LOW (ref 14–18)
MCHC RBC-ENTMCNC: 29.7 PG — SIGNIFICANT CHANGE UP (ref 27–31)
MCHC RBC-ENTMCNC: 31.9 G/DL — LOW (ref 32–37)
MCV RBC AUTO: 93 FL — SIGNIFICANT CHANGE UP (ref 80–94)
NRBC # BLD: 0 /100 WBCS — SIGNIFICANT CHANGE UP (ref 0–0)
PLATELET # BLD AUTO: 217 K/UL — SIGNIFICANT CHANGE UP (ref 130–400)
PMV BLD: 10.5 FL — HIGH (ref 7.4–10.4)
POTASSIUM SERPL-MCNC: 4.6 MMOL/L — SIGNIFICANT CHANGE UP (ref 3.5–5)
POTASSIUM SERPL-SCNC: 4.6 MMOL/L — SIGNIFICANT CHANGE UP (ref 3.5–5)
PROT SERPL-MCNC: 5.7 G/DL — LOW (ref 6–8)
RBC # BLD: 2.56 M/UL — LOW (ref 4.7–6.1)
RBC # FLD: 16.1 % — HIGH (ref 11.5–14.5)
SODIUM SERPL-SCNC: 137 MMOL/L — SIGNIFICANT CHANGE UP (ref 135–146)
TROPONIN SAMPLING TIME: SIGNIFICANT CHANGE UP
TROPONIN T, HIGH SENSITIVITY RESULT: 31 NG/L — HIGH (ref 6–21)
WBC # BLD: 5.99 K/UL — SIGNIFICANT CHANGE UP (ref 4.8–10.8)
WBC # FLD AUTO: 5.99 K/UL — SIGNIFICANT CHANGE UP (ref 4.8–10.8)

## 2024-01-31 PROCEDURE — 99223 1ST HOSP IP/OBS HIGH 75: CPT

## 2024-01-31 PROCEDURE — 99233 SBSQ HOSP IP/OBS HIGH 50: CPT

## 2024-01-31 PROCEDURE — 93306 TTE W/DOPPLER COMPLETE: CPT | Mod: 26

## 2024-01-31 PROCEDURE — 99232 SBSQ HOSP IP/OBS MODERATE 35: CPT

## 2024-01-31 RX ADMIN — TAMSULOSIN HYDROCHLORIDE 0.4 MILLIGRAM(S): 0.4 CAPSULE ORAL at 21:02

## 2024-01-31 RX ADMIN — Medication 50 MILLIGRAM(S): at 05:01

## 2024-01-31 RX ADMIN — Medication 20 MILLIGRAM(S): at 05:00

## 2024-01-31 RX ADMIN — Medication 81 MILLIGRAM(S): at 11:08

## 2024-01-31 RX ADMIN — Medication 3 MILLILITER(S): at 08:17

## 2024-01-31 RX ADMIN — Medication 40 MILLIGRAM(S): at 17:25

## 2024-01-31 RX ADMIN — Medication 3 MILLILITER(S): at 20:25

## 2024-01-31 RX ADMIN — AMIODARONE HYDROCHLORIDE 200 MILLIGRAM(S): 400 TABLET ORAL at 05:02

## 2024-01-31 RX ADMIN — ATORVASTATIN CALCIUM 10 MILLIGRAM(S): 80 TABLET, FILM COATED ORAL at 21:03

## 2024-01-31 RX ADMIN — Medication 40 MILLIGRAM(S): at 05:05

## 2024-01-31 RX ADMIN — PANTOPRAZOLE SODIUM 40 MILLIGRAM(S): 20 TABLET, DELAYED RELEASE ORAL at 05:04

## 2024-01-31 RX ADMIN — Medication 1 PATCH: at 11:07

## 2024-01-31 RX ADMIN — Medication 3 MILLILITER(S): at 14:29

## 2024-01-31 RX ADMIN — PANTOPRAZOLE SODIUM 40 MILLIGRAM(S): 20 TABLET, DELAYED RELEASE ORAL at 17:25

## 2024-01-31 RX ADMIN — Medication 88 MICROGRAM(S): at 05:04

## 2024-01-31 NOTE — CONSULT NOTE ADULT - SUBJECTIVE AND OBJECTIVE BOX
HPI:  Patient is 79 yo male with hx of CHF, atrial fib, HTN, HLD, and copd presenting with dark, and bright red blood per stool that has been going on for the past 2 days with last incidence was this am.  No further bleeding since this am   afebrile Patient also reports Cough, wheezing, and and SOB.  denies any headache, dizziness, blurry vision, CP, palpitation, abdm pain, N/V/D, numbness, or weakness     (30 Jan 2024 21:05)        Cardiology Update: 77 y/o m w/ pmh CHF, Afib, HTN, HLD, smoker 1PPD, copd on home prn O2 presents for BRBPR x2days. In ED found to have low hgb 9.. Cardiology rec requested for holding plavix. Patient follows up with Dr Putnam. He has being on asa and plavix for years, unclear reason. No history of pci/stents. He reports some cough and wheezing now improved after neb treatment. He otherwise denies chest pain, palpitation, fever/chills.                 PAST MEDICAL & SURGICAL HISTORY  Congestive heart failure    High blood cholesterol    Hypertension    Bladder cancer    COPD (chronic obstructive pulmonary disease)    Afib    No significant past surgical history        FAMILY HISTORY:  FAMILY HISTORY:      SOCIAL HISTORY:  []smoker  []Alcohol  []Drug    ALLERGIES:  No Known Allergies      MEDICATIONS:  MEDICATIONS  (STANDING):  albuterol/ipratropium for Nebulization 3 milliLiter(s) Nebulizer every 6 hours  aMIOdarone    Tablet 200 milliGRAM(s) Oral daily  aspirin enteric coated 81 milliGRAM(s) Oral daily  atorvastatin 10 milliGRAM(s) Oral at bedtime  budesonide 160 MICROgram(s)/formoterol 4.5 MICROgram(s) Inhaler 2 Puff(s) Inhalation two times a day  furosemide    Tablet 20 milliGRAM(s) Oral daily  levothyroxine 88 MICROGram(s) Oral daily  methylPREDNISolone sodium succinate Injectable 40 milliGRAM(s) IV Push two times a day  metoprolol succinate ER 50 milliGRAM(s) Oral daily  nicotine -  14 mG/24Hr(s) Patch 14 Patch Transdermal daily  pantoprazole  Injectable 40 milliGRAM(s) IV Push two times a day  sodium chloride 0.9%. 1000 milliLiter(s) (50 mL/Hr) IV Continuous <Continuous>  tamsulosin 0.4 milliGRAM(s) Oral at bedtime    MEDICATIONS  (PRN):  acetaminophen     Tablet .. 650 milliGRAM(s) Oral every 6 hours PRN Temp greater or equal to 38C (100.4F), Mild Pain (1 - 3)  albuterol    90 MICROgram(s) HFA Inhaler 2 Puff(s) Inhalation every 4 hours PRN Shortness of Breath and/or Wheezing  ALPRAZolam 0.5 milliGRAM(s) Oral two times a day PRN for anxiety  ondansetron Injectable 4 milliGRAM(s) IV Push every 6 hours PRN Nausea  senna 2 Tablet(s) Oral at bedtime PRN Constipation      HOME MEDICATIONS:  Home Medications:  Advair Diskus 250 mcg-50 mcg inhalation powder: 1 inhaled 2 times a day (30 Jan 2024 21:04)  amiodarone 200 mg oral tablet:  (04 Jul 2018 23:03)  aspirin 81 mg oral tablet, chewable:  (04 Jul 2018 23:03)  atorvastatin 10 mg oral tablet: 1 tab(s) orally once a day (04 Jul 2018 23:03)  clopidogrel 75 mg oral tablet:  (04 Jul 2018 23:03)  furosemide 20 mg oral tablet: 1 tab(s) orally once a day (04 Jul 2018 23:03)  Klor-Con 10 mEq oral tablet, extended release:  (04 Jul 2018 23:03)  levothyroxine 100 mcg (0.1 mg) oral capsule: 1 cap(s) orally once a day (04 Jul 2018 23:03)  Metoprolol Succinate ER 50 mg oral tablet, extended release:  (04 Jul 2018 23:03)  predniSONE 10 mg oral tablet: 1 tab(s) orally once a day (04 Jul 2018 23:03)  tamsulosin 0.4 mg oral capsule: 1 cap(s) orally once a day (04 Jul 2018 23:03)  Xanax 0.5 mg oral tablet: 1 tab(s) orally 2 times a day as needed for  anxiety (30 Jan 2024 21:04)      VITALS:   T(F): 96.3 (01-31 @ 05:15), Max: 98.1 (01-30 @ 21:56)  HR: 64 (01-31 @ 05:15) (64 - 87)  BP: 117/49 (01-31 @ 05:15) (117/49 - 165/67)  BP(mean): --  RR: 18 (01-30 @ 21:56) (18 - 19)  SpO2: 87% (01-31 @ 09:32) (87% - 98%)    I&O's Summary      REVIEW OF SYSTEMS:  CONSTITUTIONAL: No weakness, fevers or chills  EYES: No visual changes  ENT: No vertigo or throat pain   NECK: No pain or stiffness  RESPIRATORY: No cough, wheezing, hemoptysis; No shortness of breath  CARDIOVASCULAR: No chest pain or palpitations  GASTROINTESTINAL: No abdominal or epigastric pain. No nausea, vomiting, or hematemesis; No diarrhea or constipation. No melena or hematochezia.  GENITOURINARY: No dysuria, frequency or hematuria  NEUROLOGICAL: No numbness or weakness  SKIN: No itching, no rashes  MSK: no    PHYSICAL EXAM:  NEURO: patient is awake , alert and oriented  GEN: Not in acute distress  NECK: no thyroid enlargement, no JVD  LUNGS: Clear to auscultation bilaterally   CARDIOVASCULAR: S1/S2 present, RRR , no murmurs or rubs, no carotid bruits,  + PP bilaterally  ABD: Soft, non-tender, non-distended, +BS  EXT: No GUMARO  SKIN: Intact    LABS:                        7.6    5.99  )-----------( 217      ( 31 Jan 2024 07:54 )             23.8     01-31    137  |  103  |  27<H>  ----------------------------<  153<H>  4.6   |  25  |  1.0    Ca    8.8      31 Jan 2024 07:54    TPro  5.7<L>  /  Alb  3.6  /  TBili  0.5  /  DBili  x   /  AST  11  /  ALT  7   /  AlkPhos  67  01-31              Troponin trend:            RADIOLOGY:  TTE  < from: TTE Echo Complete w/o Contrast w/ Doppler (01.31.24 @ 08:32) >   1. Normal global left ventricular systolic function.   2. LV Ejection Fraction by Botello's Method with a biplane EF of 60 %.   3. Normal right ventricular size and function.   4. Normal left atrial size.   5. Normal right atrial size.   6. There is mild aortic root calcification.   7. Sclerotic aortic valve with normal opening.   8. Mild mitral annular calcification.   9. Moderate thickening and calcification of the anterior and posterior   mitral valve leaflets.  10. Mild mitral valve regurgitation.  11. Normal pulmonary artery pressure.  12. There is no evidence of pericardial effusion.    PHYSICIAN INTERPRETATION:  Left Ventricle: The left ventricular internal cavity size is normal.   There is no left ventricular hypertrophy. Global LV systolic function was   normal. Normal segmental left ventricular systolic function. Spectral   Doppler shows normal pattern of LV diastolic filling. Normal LV filling   pressures.  Right Ventricle: Normal right ventricular size and function. The right   ventricular size is normal. RV wall thickness is normal. RV systolic   function is normal. TV S' 0.2 m/s.  Left Atrium: Normal left atrial size. LA volume Index is 28.2 ml/m² ml/m2.  Right Atrium: Normal right atrial size.  Pericardium: There is no evidence of pericardial effusion.  Mitral Valve: Moderate thickening and calcification of the anterior and   posterior mitral valve leaflets. There is mild mitral annular   calcification. No evidence of mitral stenosis. Mild mitral valve   regurgitation is seen.  Tricuspid Valve: Structurally normal tricuspid valve, with normalleaflet   excursion. Trivial tricuspid regurgitation is visualized.  Aortic Valve: The aortic valve was not well visualized. Sclerotic aortic   valve with normal opening. No evidence of aortic valve regurgitation is   seen.  Pulmonic Valve: The pulmonic valve was not well visualized. No indication   of pulmonic valve regurgitation.  Aorta: The aortic root is normal in size and structure. There is mild   aortic root calcification.  Pulmonary Artery: Normal pulmonary artery pressure.  Venous: The inferior vena cava is normal.    < end of copied text >    < from: NM Nuclear Stress Pharmacologic Multiple (08.02.23 @ 12:50) >  Impression:  1. NORMAL LEXISCAN / REST MYOCARDIAL PERFUSION SPECT TOMOGRAPHY, WITH NO   EVIDENCE FOR ISCHEMIA DURING LEXISCAN INFUSION.  2. NORMAL RESTING LEFT VENTRICULAR WALL MOTION AND WALL THICKENING.  3. LEFT VENTRICULAR EJECTION FRACTION OF  59  % WHICH IS WITHIN RANGE OF   NORMAL.    --- End of Report ---    < end of copied text >      ECG:  < from: 12 Lead ECG (01.30.24 @ 16:58) >  Ventricular Rate 49 BPM    Atrial Rate 33 BPM    QRS Duration 102 ms    Q-T Interval 490 ms    QTC Calculation(Bazett) 442 ms    R Axis 75 degrees    T Axis 66 degrees    Diagnosis Line Sinus bradycardia with short MN  Nonspecific ST and T wave abnormality  Abnormal ECG    Confirmed by CORDELIA PUTNAM MD ([id number]) on 1/30/2024 7:26:17 PM    < end of copied text >    TELEMETRY EVENTS:

## 2024-01-31 NOTE — CONSULT NOTE ADULT - NS ATTEND AMEND GEN_ALL_CORE FT
Patient seen and examined. Pertinent labs, imaging and telemetry reviewed. I agree with the above:     Patient feeling well. He has SOB at baseline but may have worsened. He has been having bleeding for the last few days.  Denies CP.  Unclear reason for DAPT. No stents in past.   -Pt with normal echo and normal stress this past year.   -SOB likely due to significant anemia from blood loss.   -Would keep Hgb >8.   -Can hold Plavix.   -Would continue ASA if able.   -Continue with other cardiac meds.   -If patient going for endo/colonoscopy he is moderate risk for low risk procedure.   -No further cardiac work up needed prior to procedure.     Discussed with patient and ACP.
Patient seen and examined on GI rounds.  All pertinent imaging and labs were reviewed by me personally.  Plan modified above where I found needed.  Will follow.

## 2024-01-31 NOTE — CONSULT NOTE ADULT - SUBJECTIVE AND OBJECTIVE BOX
Chief complaint/Reason for consult: Bloody diarrhea    HPI:  Patient is 77 yo male with hx of CHF, atrial fib, HTN, HLD, and copd presenting with dark, and bright red blood per stool that has been going on for the past 2 days with last incidence was this am.  No further bleeding since this am   afebrile Patient also reports Cough, wheezing, and and SOB.  denies any headache, dizziness, blurry vision, CP, palpitation, abdm pain, N/V/D, numbness, or weakness     (30 Jan 2024 21:05)    GI updates: 78yMale pmh CHF, A-fib, HTN, HLD, COPD presents for bloody diarrhea      PAST MEDICAL & SURGICAL HISTORY:   Congestive heart failure      High blood cholesterol      Hypertension      Bladder cancer      COPD (chronic obstructive pulmonary disease)      Afib      No significant past surgical history            Family history:  FAMILY HISTORY:    No GI cancers in first or second degree relatives    Social History: No smoking. No alcohol. No illegal drug use.    Allergies:   No Known Allergies  Intolerances    MEDICATIONS: Home Medications:  Advair Diskus 250 mcg-50 mcg inhalation powder: 1 inhaled 2 times a day (30 Jan 2024 21:04)  amiodarone 200 mg oral tablet:  (04 Jul 2018 23:03)  aspirin 81 mg oral tablet, chewable:  (04 Jul 2018 23:03)  atorvastatin 10 mg oral tablet: 1 tab(s) orally once a day (04 Jul 2018 23:03)  clopidogrel 75 mg oral tablet:  (04 Jul 2018 23:03)  furosemide 20 mg oral tablet: 1 tab(s) orally once a day (04 Jul 2018 23:03)  Klor-Con 10 mEq oral tablet, extended release:  (04 Jul 2018 23:03)  levothyroxine 100 mcg (0.1 mg) oral capsule: 1 cap(s) orally once a day (04 Jul 2018 23:03)  Metoprolol Succinate ER 50 mg oral tablet, extended release:  (04 Jul 2018 23:03)  predniSONE 10 mg oral tablet: 1 tab(s) orally once a day (04 Jul 2018 23:03)  tamsulosin 0.4 mg oral capsule: 1 cap(s) orally once a day (04 Jul 2018 23:03)  Xanax 0.5 mg oral tablet: 1 tab(s) orally 2 times a day as needed for  anxiety (30 Jan 2024 21:04)    MEDICATIONS  (STANDING):  albuterol/ipratropium for Nebulization 3 milliLiter(s) Nebulizer every 6 hours  aMIOdarone    Tablet 200 milliGRAM(s) Oral daily  aspirin enteric coated 81 milliGRAM(s) Oral daily  atorvastatin 10 milliGRAM(s) Oral at bedtime  budesonide 160 MICROgram(s)/formoterol 4.5 MICROgram(s) Inhaler 2 Puff(s) Inhalation two times a day  furosemide    Tablet 20 milliGRAM(s) Oral daily  levothyroxine 88 MICROGram(s) Oral daily  methylPREDNISolone sodium succinate Injectable 40 milliGRAM(s) IV Push two times a day  metoprolol succinate ER 50 milliGRAM(s) Oral daily  nicotine -  14 mG/24Hr(s) Patch 14 Patch Transdermal daily  pantoprazole  Injectable 40 milliGRAM(s) IV Push two times a day  sodium chloride 0.9%. 1000 milliLiter(s) (50 mL/Hr) IV Continuous <Continuous>  tamsulosin 0.4 milliGRAM(s) Oral at bedtime    MEDICATIONS  (PRN):  acetaminophen     Tablet .. 650 milliGRAM(s) Oral every 6 hours PRN Temp greater or equal to 38C (100.4F), Mild Pain (1 - 3)  albuterol    90 MICROgram(s) HFA Inhaler 2 Puff(s) Inhalation every 4 hours PRN Shortness of Breath and/or Wheezing  ALPRAZolam 0.5 milliGRAM(s) Oral two times a day PRN for anxiety  ondansetron Injectable 4 milliGRAM(s) IV Push every 6 hours PRN Nausea  senna 2 Tablet(s) Oral at bedtime PRN Constipation        REVIEW OF SYSTEMS  General:  No weight loss, fevers, or chills.  Eyes:  No reported pain or visual changes  ENT:  No sore throat or runny nose.  NECK: No stiffness or lymphadenopathy  CV:  No chest pain or palpitations.  Resp:  No shortness of breath, cough, wheezing or hemoptysis  GI:  No abdominal pain, nausea, vomiting, dysphagia, diarrhea or constipation. + rectal bleeding, no melena, or hematemesis.  Muscle:  No aches or weakness  Neuro:  No tingling, numbness       VITALS:   T(F): 97.9 (01-31-24 @ 14:30), Max: 98.1 (01-30-24 @ 21:56)  HR: 72 (01-31-24 @ 14:30) (64 - 87)  BP: 127/77 (01-31-24 @ 14:30) (117/49 - 165/67)  RR: 18 (01-30-24 @ 21:56) (18 - 19)  SpO2: 94% (01-31-24 @ 14:30) (87% - 98%)    PHYSICAL EXAM:  GENERAL: Alert and oriented x3  HEAD:  Atraumatic, Normocephalic  EYES: conjunctiva and sclera clear  NECK: Supple, No thyromegaly   CHEST/LUNG: Clear to auscultation bilaterally; No wheeze, rhonchi, or rales  HEART: Regular rate and rhythm; normal S1, S2, No murmurs.  ABDOMEN: Soft, nontender, nondistended; Bowel sounds present  NEUROLOGY: No asterixis or tremor  SKIN: Intact, no jaundice          LABS:  01-31    137  |  103  |  27<H>  ----------------------------<  153<H>  4.6   |  25  |  1.0    Ca    8.8      31 Jan 2024 07:54    TPro  5.7<L>  /  Alb  3.6  /  TBili  0.5  /  DBili  x   /  AST  11  /  ALT  7   /  AlkPhos  67  01-31                          7.6    5.99  )-----------( 217      ( 31 Jan 2024 07:54 )             23.8     LIVER FUNCTIONS - ( 31 Jan 2024 07:54 )  Alb: 3.6 g/dL / Pro: 5.7 g/dL / ALK PHOS: 67 U/L / ALT: 7 U/L / AST: 11 U/L / GGT: x               IMAGING:         Chief complaint/Reason for consult: Bloody diarrhea    HPI:  Patient is 77 yo male with hx of CHF, atrial fib, HTN, HLD, and copd presenting with dark, and bright red blood per stool that has been going on for the past 2 days with last incidence was this am.  No further bleeding since this am   afebrile Patient also reports Cough, wheezing, and and SOB.  denies any headache, dizziness, blurry vision, CP, palpitation, abdm pain, N/V/D, numbness, or weakness     (2024 21:05)    GI updates: 78yMale pmh CHF on aspirin and plavix, A-fib, HTN, HLD, COPD presents for bloody diarrhea, 4-5 episodes per day for two days. Currently Patient denies nausea, vomiting, hematemesis, melena, blood in stool, diarrhea, constipation, abdominal pain.      PAST MEDICAL & SURGICAL HISTORY:   Congestive heart failure      High blood cholesterol      Hypertension      Bladder cancer      COPD (chronic obstructive pulmonary disease)      Afib      No significant past surgical history            Family history:  FAMILY HISTORY:    No GI cancers in first or second degree relatives    Social History: +cigarette smoking. No alcohol. No illegal drug use.    Allergies:   No Known Allergies  Intolerances    MEDICATIONS: Home Medications:  Advair Diskus 250 mcg-50 mcg inhalation powder: 1 inhaled 2 times a day (2024 21:04)  amiodarone 200 mg oral tablet:  (2018 23:03)  aspirin 81 mg oral tablet, chewable:  (2018 23:03)  atorvastatin 10 mg oral tablet: 1 tab(s) orally once a day (2018 23:03)  clopidogrel 75 mg oral tablet:  (2018 23:03)  furosemide 20 mg oral tablet: 1 tab(s) orally once a day (2018 23:03)  Klor-Con 10 mEq oral tablet, extended release:  (2018 23:03)  levothyroxine 100 mcg (0.1 mg) oral capsule: 1 cap(s) orally once a day (2018 23:03)  Metoprolol Succinate ER 50 mg oral tablet, extended release:  (2018 23:03)  predniSONE 10 mg oral tablet: 1 tab(s) orally once a day (2018 23:03)  tamsulosin 0.4 mg oral capsule: 1 cap(s) orally once a day (2018 23:03)  Xanax 0.5 mg oral tablet: 1 tab(s) orally 2 times a day as needed for  anxiety (2024 21:04)    MEDICATIONS  (STANDING):  albuterol/ipratropium for Nebulization 3 milliLiter(s) Nebulizer every 6 hours  aMIOdarone    Tablet 200 milliGRAM(s) Oral daily  aspirin enteric coated 81 milliGRAM(s) Oral daily  atorvastatin 10 milliGRAM(s) Oral at bedtime  budesonide 160 MICROgram(s)/formoterol 4.5 MICROgram(s) Inhaler 2 Puff(s) Inhalation two times a day  furosemide    Tablet 20 milliGRAM(s) Oral daily  levothyroxine 88 MICROGram(s) Oral daily  methylPREDNISolone sodium succinate Injectable 40 milliGRAM(s) IV Push two times a day  metoprolol succinate ER 50 milliGRAM(s) Oral daily  nicotine -  14 mG/24Hr(s) Patch 14 Patch Transdermal daily  pantoprazole  Injectable 40 milliGRAM(s) IV Push two times a day  sodium chloride 0.9%. 1000 milliLiter(s) (50 mL/Hr) IV Continuous <Continuous>  tamsulosin 0.4 milliGRAM(s) Oral at bedtime    MEDICATIONS  (PRN):  acetaminophen     Tablet .. 650 milliGRAM(s) Oral every 6 hours PRN Temp greater or equal to 38C (100.4F), Mild Pain (1 - 3)  albuterol    90 MICROgram(s) HFA Inhaler 2 Puff(s) Inhalation every 4 hours PRN Shortness of Breath and/or Wheezing  ALPRAZolam 0.5 milliGRAM(s) Oral two times a day PRN for anxiety  ondansetron Injectable 4 milliGRAM(s) IV Push every 6 hours PRN Nausea  senna 2 Tablet(s) Oral at bedtime PRN Constipation        REVIEW OF SYSTEMS  General:  No weight loss, fevers, or chills.  Eyes:  No reported pain or visual changes  ENT:  No sore throat or runny nose.  NECK: No stiffness or lymphadenopathy  CV:  No chest pain or palpitations.  Resp:  No shortness of breath, cough, wheezing or hemoptysis  GI:  No abdominal pain, nausea, vomiting, dysphagia, diarrhea or constipation. + rectal bleeding, no melena, or hematemesis.  Muscle:  No aches or weakness  Neuro:  No tingling, numbness       VITALS:   T(F): 97.9 (24 @ 14:30), Max: 98.1 (24 @ 21:56)  HR: 72 (24 @ 14:30) (64 - 87)  BP: 127/77 (24 @ 14:30) (117/49 - 165/67)  RR: 18 (24 @ 21:56) (18 - 19)  SpO2: 94% (24 @ 14:30) (87% - 98%)    PHYSICAL EXAM:  GENERAL: Alert and oriented x3  HEAD:  Atraumatic, Normocephalic  EYES: conjunctiva and sclera clear  NECK: Supple, No thyromegaly   CHEST/LUNG: Clear to auscultation bilaterally; No wheeze, rhonchi, or rales  HEART: Regular rate and rhythm; normal S1, S2, No murmurs.  ABDOMEN: Soft, nontender, nondistended; Bowel sounds present  NEUROLOGY: No asterixis or tremor  SKIN: Intact, no jaundice          LABS:      137  |  103  |  27<H>  ----------------------------<  153<H>  4.6   |  25  |  1.0    Ca    8.8      2024 07:54    TPro  5.7<L>  /  Alb  3.6  /  TBili  0.5  /  DBili  x   /  AST  11  /  ALT  7   /  AlkPhos  67                            7.6    5.99  )-----------( 217      ( 2024 07:54 )             23.8     LIVER FUNCTIONS - ( 2024 07:54 )  Alb: 3.6 g/dL / Pro: 5.7 g/dL / ALK PHOS: 67 U/L / ALT: 7 U/L / AST: 11 U/L / GGT: x               IMAGING:    < from: Xray Chest 1 View- PORTABLE-Urgent (24 @ 17:24) >    ACC: [id number] EXAM:  XR CHEST PORTABLE URGENT 1V   ORDERED BY: STEVE DRUMMOND     PROCEDURE DATE:  2024          INTERPRETATION:  Clinical History / Reason for exam: Shortness of breath    Comparison : Chest radiograph 2018.    Technique/Positionin AP views of the chest.    Findings:    Support devices: None.    Cardiac/mediastinum/hilum: Unremarkable.    Lung parenchyma/Pleura: Bibasilar opacities with associated congestive   changes    Skeleton/soft tissues: Degenerative osseous changes.      Impression:    Bibasilar opacities with associated congestive changes..    --- End of Report ---          ÁNGELA RENE MD; Resident Radiologist  This document has been electronically signed.  MARY AKBAR MD; Attending Radiologist  This document has been electronically signed. 2024 10:19AM    < end of copied text >

## 2024-01-31 NOTE — CONSULT NOTE ADULT - ASSESSMENT
77 y/o m w/ pmh CHF, pAfib, HTN, HLD, smoker 1PPD, copd on home prn O2 presents for BRBPR x2days. In ED found to have low hgb 9.. Cardiology rec requested for holding plavix    ecg sinus kellie, NS ST.T  TTE normal LVSF EF 60%  NM stress: 08/2023: no evidence of ischemia    Plan  - currently hd stable, no evidence of acute cardiac ischemia or ADHF  - ok to hold Plavix for now due to GIB but will continue asa  - if colonoscopy planned cardiac risk is intermediate  - Transfuse prn  - continue Amiodarone/Toprol  - smoking cessation  - F/U with Dr Delong      Discussed w/ Dr Menchaca
 78yMale pmh CHF on aspirin and plavix, A-fib, HTN, HLD, COPD presents for bloody diarrhea, 4-5 episodes per day for two days    #Bloody diarrhea  #patient with Colonoscopy 1-2 moths ago  #history of benign polyps  #internal hemorrhoids   Rec  -appreciate cardio EF WNL can hold plavix  -stool studies C-diff and GI PCR  -if stool studies negative will plan for Colonoscopy Friday  -clear liquid diet  -Maintain Hemodynamic Stability   -Monitor CBC  -CMP,Optimize Electrolytes  -PT,PTT,INR  -EKG, Chest-Xray   -Transfuse prn to hgb >8  -Two large bore IV lines  -PPI BID  -Monitor Vital Signs  -Monitor Stool For blood, frequency, consistency, melena  -Active Type and Screen  -Iron Studies, Folate, Vitamin B12 levels     #Bibasilar opacities with associated congestive changes..  Rec  - Care as per primary team

## 2024-02-01 LAB
ANION GAP SERPL CALC-SCNC: 9 MMOL/L — SIGNIFICANT CHANGE UP (ref 7–14)
BUN SERPL-MCNC: 25 MG/DL — HIGH (ref 10–20)
CALCIUM SERPL-MCNC: 9.6 MG/DL — SIGNIFICANT CHANGE UP (ref 8.4–10.5)
CHLORIDE SERPL-SCNC: 101 MMOL/L — SIGNIFICANT CHANGE UP (ref 98–110)
CO2 SERPL-SCNC: 28 MMOL/L — SIGNIFICANT CHANGE UP (ref 17–32)
CREAT SERPL-MCNC: 1.1 MG/DL — SIGNIFICANT CHANGE UP (ref 0.7–1.5)
EGFR: 68 ML/MIN/1.73M2 — SIGNIFICANT CHANGE UP
GLUCOSE SERPL-MCNC: 138 MG/DL — HIGH (ref 70–99)
HCT VFR BLD CALC: 24.3 % — LOW (ref 42–52)
HGB BLD-MCNC: 7.6 G/DL — LOW (ref 14–18)
MCHC RBC-ENTMCNC: 29 PG — SIGNIFICANT CHANGE UP (ref 27–31)
MCHC RBC-ENTMCNC: 31.3 G/DL — LOW (ref 32–37)
MCV RBC AUTO: 92.7 FL — SIGNIFICANT CHANGE UP (ref 80–94)
NRBC # BLD: 0 /100 WBCS — SIGNIFICANT CHANGE UP (ref 0–0)
PLATELET # BLD AUTO: 246 K/UL — SIGNIFICANT CHANGE UP (ref 130–400)
PMV BLD: 10.5 FL — HIGH (ref 7.4–10.4)
POTASSIUM SERPL-MCNC: 5.1 MMOL/L — HIGH (ref 3.5–5)
POTASSIUM SERPL-SCNC: 5.1 MMOL/L — HIGH (ref 3.5–5)
RAPID RVP RESULT: SIGNIFICANT CHANGE UP
RBC # BLD: 2.62 M/UL — LOW (ref 4.7–6.1)
RBC # FLD: 16.5 % — HIGH (ref 11.5–14.5)
SARS-COV-2 RNA SPEC QL NAA+PROBE: SIGNIFICANT CHANGE UP
SODIUM SERPL-SCNC: 138 MMOL/L — SIGNIFICANT CHANGE UP (ref 135–146)
WBC # BLD: 14.6 K/UL — HIGH (ref 4.8–10.8)
WBC # FLD AUTO: 14.6 K/UL — HIGH (ref 4.8–10.8)

## 2024-02-01 PROCEDURE — 99233 SBSQ HOSP IP/OBS HIGH 50: CPT

## 2024-02-01 PROCEDURE — 99232 SBSQ HOSP IP/OBS MODERATE 35: CPT

## 2024-02-01 RX ORDER — IRON SUCROSE 20 MG/ML
250 INJECTION, SOLUTION INTRAVENOUS ONCE
Refills: 0 | Status: COMPLETED | OUTPATIENT
Start: 2024-02-01 | End: 2024-02-01

## 2024-02-01 RX ORDER — METOPROLOL TARTRATE 50 MG
50 TABLET ORAL
Refills: 0 | Status: DISCONTINUED | OUTPATIENT
Start: 2024-02-01 | End: 2024-02-02

## 2024-02-01 RX ORDER — CHLORHEXIDINE GLUCONATE 213 G/1000ML
1 SOLUTION TOPICAL
Refills: 0 | Status: DISCONTINUED | OUTPATIENT
Start: 2024-02-01 | End: 2024-02-02

## 2024-02-01 RX ORDER — SOD SULF/SODIUM/NAHCO3/KCL/PEG
4000 SOLUTION, RECONSTITUTED, ORAL ORAL ONCE
Refills: 0 | Status: COMPLETED | OUTPATIENT
Start: 2024-02-01 | End: 2024-02-01

## 2024-02-01 RX ADMIN — Medication 88 MICROGRAM(S): at 05:27

## 2024-02-01 RX ADMIN — Medication 4000 MILLILITER(S): at 16:47

## 2024-02-01 RX ADMIN — Medication 3 MILLILITER(S): at 19:48

## 2024-02-01 RX ADMIN — ATORVASTATIN CALCIUM 10 MILLIGRAM(S): 80 TABLET, FILM COATED ORAL at 21:28

## 2024-02-01 RX ADMIN — Medication 20 MILLIGRAM(S): at 13:35

## 2024-02-01 RX ADMIN — Medication 50 MILLIGRAM(S): at 08:30

## 2024-02-01 RX ADMIN — Medication 14 PATCH: at 11:36

## 2024-02-01 RX ADMIN — TAMSULOSIN HYDROCHLORIDE 0.4 MILLIGRAM(S): 0.4 CAPSULE ORAL at 21:28

## 2024-02-01 RX ADMIN — IRON SUCROSE 262.5 MILLIGRAM(S): 20 INJECTION, SOLUTION INTRAVENOUS at 13:36

## 2024-02-01 RX ADMIN — Medication 3 MILLILITER(S): at 08:01

## 2024-02-01 RX ADMIN — Medication 20 MILLIGRAM(S): at 05:27

## 2024-02-01 RX ADMIN — Medication 40 MILLIGRAM(S): at 05:26

## 2024-02-01 RX ADMIN — PANTOPRAZOLE SODIUM 40 MILLIGRAM(S): 20 TABLET, DELAYED RELEASE ORAL at 05:28

## 2024-02-01 RX ADMIN — BUDESONIDE AND FORMOTEROL FUMARATE DIHYDRATE 2 PUFF(S): 160; 4.5 AEROSOL RESPIRATORY (INHALATION) at 08:29

## 2024-02-01 RX ADMIN — Medication 40 MILLIGRAM(S): at 18:24

## 2024-02-01 RX ADMIN — Medication 1 PATCH: at 11:34

## 2024-02-01 RX ADMIN — CHLORHEXIDINE GLUCONATE 1 APPLICATION(S): 213 SOLUTION TOPICAL at 11:34

## 2024-02-01 RX ADMIN — AMIODARONE HYDROCHLORIDE 200 MILLIGRAM(S): 400 TABLET ORAL at 05:27

## 2024-02-01 RX ADMIN — PANTOPRAZOLE SODIUM 40 MILLIGRAM(S): 20 TABLET, DELAYED RELEASE ORAL at 18:23

## 2024-02-02 ENCOUNTER — TRANSCRIPTION ENCOUNTER (OUTPATIENT)
Age: 79
End: 2024-02-02

## 2024-02-02 LAB
ALBUMIN SERPL ELPH-MCNC: 3.8 G/DL — SIGNIFICANT CHANGE UP (ref 3.5–5.2)
ALP SERPL-CCNC: 70 U/L — SIGNIFICANT CHANGE UP (ref 30–115)
ALT FLD-CCNC: 13 U/L — SIGNIFICANT CHANGE UP (ref 0–41)
ANION GAP SERPL CALC-SCNC: 10 MMOL/L — SIGNIFICANT CHANGE UP (ref 7–14)
APTT BLD: 28.2 SEC — SIGNIFICANT CHANGE UP (ref 27–39.2)
AST SERPL-CCNC: 16 U/L — SIGNIFICANT CHANGE UP (ref 0–41)
BILIRUB SERPL-MCNC: 0.5 MG/DL — SIGNIFICANT CHANGE UP (ref 0.2–1.2)
BUN SERPL-MCNC: 24 MG/DL — HIGH (ref 10–20)
CALCIUM SERPL-MCNC: 9.2 MG/DL — SIGNIFICANT CHANGE UP (ref 8.4–10.5)
CHLORIDE SERPL-SCNC: 99 MMOL/L — SIGNIFICANT CHANGE UP (ref 98–110)
CO2 SERPL-SCNC: 30 MMOL/L — SIGNIFICANT CHANGE UP (ref 17–32)
CREAT SERPL-MCNC: 1.2 MG/DL — SIGNIFICANT CHANGE UP (ref 0.7–1.5)
EGFR: 61 ML/MIN/1.73M2 — SIGNIFICANT CHANGE UP
FERRITIN SERPL-MCNC: 149 NG/ML — SIGNIFICANT CHANGE UP (ref 30–400)
GLUCOSE SERPL-MCNC: 104 MG/DL — HIGH (ref 70–99)
HCT VFR BLD CALC: 24.9 % — LOW (ref 42–52)
HGB BLD-MCNC: 8 G/DL — LOW (ref 14–18)
INR BLD: 0.99 RATIO — SIGNIFICANT CHANGE UP (ref 0.65–1.3)
IRON SATN MFR SERPL: 12 % — LOW (ref 15–50)
IRON SATN MFR SERPL: 38 UG/DL — SIGNIFICANT CHANGE UP (ref 35–150)
MCHC RBC-ENTMCNC: 29.5 PG — SIGNIFICANT CHANGE UP (ref 27–31)
MCHC RBC-ENTMCNC: 32.1 G/DL — SIGNIFICANT CHANGE UP (ref 32–37)
MCV RBC AUTO: 91.9 FL — SIGNIFICANT CHANGE UP (ref 80–94)
NRBC # BLD: 0 /100 WBCS — SIGNIFICANT CHANGE UP (ref 0–0)
PLATELET # BLD AUTO: 256 K/UL — SIGNIFICANT CHANGE UP (ref 130–400)
PMV BLD: 10.8 FL — HIGH (ref 7.4–10.4)
POTASSIUM SERPL-MCNC: 4.3 MMOL/L — SIGNIFICANT CHANGE UP (ref 3.5–5)
POTASSIUM SERPL-SCNC: 4.3 MMOL/L — SIGNIFICANT CHANGE UP (ref 3.5–5)
PROT SERPL-MCNC: 6.2 G/DL — SIGNIFICANT CHANGE UP (ref 6–8)
PROTHROM AB SERPL-ACNC: 11.3 SEC — SIGNIFICANT CHANGE UP (ref 9.95–12.87)
RBC # BLD: 2.71 M/UL — LOW (ref 4.7–6.1)
RBC # FLD: 16.7 % — HIGH (ref 11.5–14.5)
SODIUM SERPL-SCNC: 139 MMOL/L — SIGNIFICANT CHANGE UP (ref 135–146)
TIBC SERPL-MCNC: 307 UG/DL — SIGNIFICANT CHANGE UP (ref 220–430)
UIBC SERPL-MCNC: 269 UG/DL — SIGNIFICANT CHANGE UP (ref 110–370)
WBC # BLD: 12.71 K/UL — HIGH (ref 4.8–10.8)
WBC # FLD AUTO: 12.71 K/UL — HIGH (ref 4.8–10.8)

## 2024-02-02 PROCEDURE — 99232 SBSQ HOSP IP/OBS MODERATE 35: CPT

## 2024-02-02 PROCEDURE — 45382 COLONOSCOPY W/CONTROL BLEED: CPT

## 2024-02-02 RX ORDER — IRON SUCROSE 20 MG/ML
200 INJECTION, SOLUTION INTRAVENOUS EVERY 24 HOURS
Refills: 0 | Status: DISCONTINUED | OUTPATIENT
Start: 2024-02-02 | End: 2024-02-04

## 2024-02-02 RX ORDER — SODIUM CHLORIDE 9 MG/ML
1000 INJECTION, SOLUTION INTRAVENOUS
Refills: 0 | Status: DISCONTINUED | OUTPATIENT
Start: 2024-02-02 | End: 2024-02-02

## 2024-02-02 RX ORDER — IRON SUCROSE 20 MG/ML
200 INJECTION, SOLUTION INTRAVENOUS EVERY 24 HOURS
Refills: 0 | Status: DISCONTINUED | OUTPATIENT
Start: 2024-02-02 | End: 2024-02-02

## 2024-02-02 RX ORDER — LEVOTHYROXINE SODIUM 125 MCG
88 TABLET ORAL DAILY
Refills: 0 | Status: DISCONTINUED | OUTPATIENT
Start: 2024-02-02 | End: 2024-02-04

## 2024-02-02 RX ORDER — PANTOPRAZOLE SODIUM 20 MG/1
40 TABLET, DELAYED RELEASE ORAL
Refills: 0 | Status: DISCONTINUED | OUTPATIENT
Start: 2024-02-02 | End: 2024-02-04

## 2024-02-02 RX ORDER — ALBUTEROL 90 UG/1
2 AEROSOL, METERED ORAL EVERY 4 HOURS
Refills: 0 | Status: DISCONTINUED | OUTPATIENT
Start: 2024-02-02 | End: 2024-02-04

## 2024-02-02 RX ORDER — ALPRAZOLAM 0.25 MG
0.5 TABLET ORAL
Refills: 0 | Status: DISCONTINUED | OUTPATIENT
Start: 2024-02-02 | End: 2024-02-04

## 2024-02-02 RX ORDER — ATORVASTATIN CALCIUM 80 MG/1
10 TABLET, FILM COATED ORAL AT BEDTIME
Refills: 0 | Status: DISCONTINUED | OUTPATIENT
Start: 2024-02-02 | End: 2024-02-04

## 2024-02-02 RX ORDER — BUDESONIDE AND FORMOTEROL FUMARATE DIHYDRATE 160; 4.5 UG/1; UG/1
2 AEROSOL RESPIRATORY (INHALATION)
Refills: 0 | Status: DISCONTINUED | OUTPATIENT
Start: 2024-02-02 | End: 2024-02-04

## 2024-02-02 RX ORDER — SENNA PLUS 8.6 MG/1
2 TABLET ORAL AT BEDTIME
Refills: 0 | Status: DISCONTINUED | OUTPATIENT
Start: 2024-02-02 | End: 2024-02-04

## 2024-02-02 RX ORDER — ASPIRIN/CALCIUM CARB/MAGNESIUM 324 MG
81 TABLET ORAL DAILY
Refills: 0 | Status: DISCONTINUED | OUTPATIENT
Start: 2024-02-02 | End: 2024-02-04

## 2024-02-02 RX ORDER — CHLORHEXIDINE GLUCONATE 213 G/1000ML
1 SOLUTION TOPICAL
Refills: 0 | Status: DISCONTINUED | OUTPATIENT
Start: 2024-02-02 | End: 2024-02-04

## 2024-02-02 RX ORDER — IPRATROPIUM/ALBUTEROL SULFATE 18-103MCG
3 AEROSOL WITH ADAPTER (GRAM) INHALATION EVERY 6 HOURS
Refills: 0 | Status: DISCONTINUED | OUTPATIENT
Start: 2024-02-02 | End: 2024-02-04

## 2024-02-02 RX ORDER — AMIODARONE HYDROCHLORIDE 400 MG/1
200 TABLET ORAL DAILY
Refills: 0 | Status: DISCONTINUED | OUTPATIENT
Start: 2024-02-02 | End: 2024-02-04

## 2024-02-02 RX ORDER — TAMSULOSIN HYDROCHLORIDE 0.4 MG/1
0.4 CAPSULE ORAL AT BEDTIME
Refills: 0 | Status: DISCONTINUED | OUTPATIENT
Start: 2024-02-02 | End: 2024-02-04

## 2024-02-02 RX ORDER — FUROSEMIDE 40 MG
20 TABLET ORAL DAILY
Refills: 0 | Status: DISCONTINUED | OUTPATIENT
Start: 2024-02-02 | End: 2024-02-04

## 2024-02-02 RX ADMIN — Medication 3 MILLILITER(S): at 09:16

## 2024-02-02 RX ADMIN — Medication 40 MILLIGRAM(S): at 17:48

## 2024-02-02 RX ADMIN — IRON SUCROSE 110 MILLIGRAM(S): 20 INJECTION, SOLUTION INTRAVENOUS at 17:49

## 2024-02-02 RX ADMIN — Medication 50 MILLIGRAM(S): at 08:49

## 2024-02-02 RX ADMIN — SODIUM CHLORIDE 75 MILLILITER(S): 9 INJECTION, SOLUTION INTRAVENOUS at 15:11

## 2024-02-02 RX ADMIN — Medication 40 MILLIGRAM(S): at 05:57

## 2024-02-02 RX ADMIN — PANTOPRAZOLE SODIUM 40 MILLIGRAM(S): 20 TABLET, DELAYED RELEASE ORAL at 05:57

## 2024-02-02 RX ADMIN — TAMSULOSIN HYDROCHLORIDE 0.4 MILLIGRAM(S): 0.4 CAPSULE ORAL at 21:02

## 2024-02-02 RX ADMIN — ATORVASTATIN CALCIUM 10 MILLIGRAM(S): 80 TABLET, FILM COATED ORAL at 21:02

## 2024-02-02 RX ADMIN — IRON SUCROSE 110 MILLIGRAM(S): 20 INJECTION, SOLUTION INTRAVENOUS at 10:10

## 2024-02-02 RX ADMIN — PANTOPRAZOLE SODIUM 40 MILLIGRAM(S): 20 TABLET, DELAYED RELEASE ORAL at 17:48

## 2024-02-02 RX ADMIN — Medication 3 MILLILITER(S): at 19:37

## 2024-02-02 RX ADMIN — Medication 1 PATCH: at 06:03

## 2024-02-02 NOTE — CHART NOTE - NSCHARTNOTEFT_GEN_A_CORE
Due to current vital signs, will separate administration times for amiodarone and Toprol
Please refer to sunrise results section for Colonoscopy findings and recommendations   all discontinued via transfer orders reinitiated and Hospitalist MUST review ALL orders to assure they are accurate and correct
Pt to PACU. Report to RN. GIOVANY.
patient for Colonoscopy today

## 2024-02-03 LAB
HCT VFR BLD CALC: 23 % — LOW (ref 42–52)
HGB BLD-MCNC: 7.4 G/DL — LOW (ref 14–18)

## 2024-02-03 PROCEDURE — 99232 SBSQ HOSP IP/OBS MODERATE 35: CPT

## 2024-02-03 RX ADMIN — Medication 40 MILLIGRAM(S): at 05:44

## 2024-02-03 RX ADMIN — ATORVASTATIN CALCIUM 10 MILLIGRAM(S): 80 TABLET, FILM COATED ORAL at 22:13

## 2024-02-03 RX ADMIN — Medication 20 MILLIGRAM(S): at 05:29

## 2024-02-03 RX ADMIN — Medication 81 MILLIGRAM(S): at 11:07

## 2024-02-03 RX ADMIN — Medication 88 MICROGRAM(S): at 05:29

## 2024-02-03 RX ADMIN — Medication 3 MILLILITER(S): at 19:05

## 2024-02-03 RX ADMIN — TAMSULOSIN HYDROCHLORIDE 0.4 MILLIGRAM(S): 0.4 CAPSULE ORAL at 22:12

## 2024-02-03 RX ADMIN — BUDESONIDE AND FORMOTEROL FUMARATE DIHYDRATE 2 PUFF(S): 160; 4.5 AEROSOL RESPIRATORY (INHALATION) at 11:07

## 2024-02-03 RX ADMIN — PANTOPRAZOLE SODIUM 40 MILLIGRAM(S): 20 TABLET, DELAYED RELEASE ORAL at 05:29

## 2024-02-03 RX ADMIN — IRON SUCROSE 110 MILLIGRAM(S): 20 INJECTION, SOLUTION INTRAVENOUS at 18:28

## 2024-02-03 RX ADMIN — Medication 3 MILLILITER(S): at 14:00

## 2024-02-03 RX ADMIN — Medication 3 MILLILITER(S): at 07:37

## 2024-02-03 RX ADMIN — AMIODARONE HYDROCHLORIDE 200 MILLIGRAM(S): 400 TABLET ORAL at 05:30

## 2024-02-03 NOTE — PROGRESS NOTE ADULT - ASSESSMENT
77 yo male with hx of CHF, atrial fib, HTN, HLD, and copd presenting with     #dark, and bright red blood per stool likely 2/2 GIB  #Acute on chronic amenia, iron deficiency anemia  - Last episode of bleeding this am  - hgb 9.1-->8.3-->7.6  PLAN  - Type and screen; Monitor H/H closely  - transfuse PRN to keep hgb > 7  - spoke to patient's outpatient hematologist  - will f/u ferritin, iron, TIBC level  - will give a dose of IV iron  - IV PPI  - diet advanced to [facility]  - plan for colonoscopy tomorrow   - CTA of the abdm and pelvic if there is active bleeding  - GI consult   - Patient was on plavix unclear reason, no recent stent.  Hold plavix     #Abnormal trop  #Hx of paroxysmal atrial fib (not on AC), HTN, HLD  - EKG shows no acute ischemic changes  - No CP  - Trop level flat  PLAN  - cardio eval appreciated  - Can hold Plavix.   - cont ASA   - If patient going for endo/colonoscopy he is moderate risk for low risk procedure.     #Acute COPD exacerbation  - c/w IV steriod, neb tx, and monitor clinical pic  - Counselled about smoking.  Oxygen   - Nicotine patch       #HTN/HLD/Anxiety  - Continue with home medications     Progress Note Handoff  Pending Consults: none  Pending Tests: cbc, colonoscopy  Pending Results: clinical improvement   Family Discussion: Patient and daughter  Disposition: Home__Xacute___/SNF______/Other_____/Unknown at this time_____  Spent over 55 min reviewing chart, speaking with patient/family and on coordinating patient care during interdisciplinary rounds
 78yMale pmh CHF on aspirin and plavix, A-fib, HTN, HLD, COPD on home O2, presents for bloody diarrhea, 4-5 episodes per day for two days    #Bloody diarrhea  #patient with Colonoscopy 1-2 moths ago  #history of benign polyps  #internal hemorrhoids   Rec  -appreciate cardio EF WNL can hold plavix  -stool studies C-diff and GI PCR  -Colonoscopy tomorrow  -prep ordered by primary team  -clear liquid diet  -Maintain Hemodynamic Stability   -Monitor CBC  -CMP,Optimize Electrolytes  -PT,PTT,INR  -EKG, Chest-Xray   -Transfuse prn to hgb >8  -Two large bore IV lines  -PPI BID  -Monitor Vital Signs  -Monitor Stool For blood, frequency, consistency, melena  -Active Type and Screen  -Iron Studies, Folate, Vitamin B12 levels     #Bibasilar opacities with associated congestive changes..  Rec  - Care as per primary team   
79 yo male with hx of CHF, atrial fib, HTN, HLD, and copd presenting with     #dark, and bright red blood per stool likely 2/2 GIB  #Acute on chronic amenia  - Last episode of bleeding this am  - hgb 9.1-->8.3-->7.6  PLAN  - Type and screen; Monitor H/H closely  - transfuse PRN to keep hgb > 7  - IV PPI  - diet advanced to [facility]  - CTA of the abdm and pelvic if there is active bleeding  - GI consult   - Patient was on plavix unclear reason, no recent stent.  Hold plavix     #Abnormal trop  #Hx of paroxysmal atrial fib (not on AC), HTN, HLD  - EKG shows no acute ischemic changes  - No CP  - Trop level flat  PLAN  - cardio eval appreciated  - Can hold Plavix.   - cont ASA   - If patient going for endo/colonoscopy he is moderate risk for low risk procedure.     #Acute COPD exacerbation  - c/w IV steriod, neb tx, and monitor clinical pic  - Counselled about smoking.  Oxygen   - Nicotine patch   - F/U CXR reading     #HTN/HLD/Anxiety  - Continue with home medications     Progress Note Handoff  Pending Consults: GI  Pending Tests: cbc, possible egd/colonoscopy  Pending Results: clinical improvement   Family Discussion: Patient   Disposition: Home__Xacute___/SNF______/Other_____/Unknown at this time_____  Spent over 55 min reviewing chart, speaking with patient/family and on coordinating patient care during interdisciplinary rounds
77 yo male with hx of CHF, atrial fib, HTN, HLD, and copd presenting with     #dark, and bright red blood per stool likely 2/2 GIB  #Acute on chronic amenia, iron deficiency anemia  - Last episode of bleeding this am  - hgb 9.1-->8.3-->7.6  PLAN  - Type and screen; Monitor H/H closely  - transfuse PRN to keep hgb > 7  - spoke to patient's outpatient hematologist  - IV iron (day 2)  - IV PPI  - NPO; plan for colonoscopy today   - CTA of the abdm and pelvic if there is active bleeding  - GI consult   - Patient was on plavix unclear reason, no recent stent.  Hold plavix     #Abnormal trop  #Hx of paroxysmal atrial fib (not on AC), HTN, HLD  - EKG shows no acute ischemic changes  - No CP  - Trop level flat  PLAN  - cardio eval appreciated  - Can hold Plavix  - cont ASA   - If patient going for endo/colonoscopy he is moderate risk for low risk procedure.     #Acute COPD exacerbation  - c/w IV steriod, neb tx, and monitor clinical pic  - Counselled about smoking.  Oxygen   - Nicotine patch     #HTN/HLD/Anxiety  - Continue with home medications     Progress Note Handoff  Pending Consults: none  Pending Tests: cbc, colonoscopy  Pending Results: clinical improvement   Family Discussion: Patient and daughter  Disposition: Home__Xlikely dc in 24 hrs___/SNF______/Other_____/Unknown at this time_____  Spent over 55 min reviewing chart, speaking with patient/family and on coordinating patient care during interdisciplinary rounds
79 yo male with hx of CHF, atrial fib, HTN, HLD, and copd presenting with     #dark, and bright red blood per stool likely 2/2 GIB  #Acute on chronic amenia, iron deficiency anemia  - Last episode of bleeding this am  - hgb 9.1-->8.3-->7.6--> 7.3  - s/p coloscopy: Normal mucosa in the terminal ileum. AVMs in the cecum. (Hemostasis, Endoclip). Diverticulosis of the sigmoid colon. Internal hemorrhoids. The colon was otherwise unremarkable.  PLAN  - Type and screen; Monitor H/H closely  - will transfuse 1U pRBC today  - spoke to patient's outpatient hematologist  - IV iron (day 3)  - IV PPI  - CTA of the abdm and pelvic if there is active bleeding  - GI consult appreciated  - plan to restart plavix if hgb stable    #Abnormal trop  #Hx of paroxysmal atrial fib (not on AC), HTN, HLD  - EKG shows no acute ischemic changes  - No CP  - Trop level flat  PLAN  - cardio eval appreciated  - Can hold Plavix  - cont ASA     #Acute COPD exacerbation  - c/w neb tx, and monitor clinical pic  - prednisone 40mg for 5 days  - Counselled about smoking.  Oxygen   - Nicotine patch     #HTN/HLD/Anxiety  - Continue with home medications     Progress Note Handoff  Pending Consults: none  Pending Tests: cbc  Pending Results: clinical improvement   Family Discussion: Patient and daughter  Disposition: Home__Xlikely dc in 24 hrs___/SNF______/Other_____/Unknown at this time_____  Spent over 55 min reviewing chart, speaking with patient/family and on coordinating patient care during interdisciplinary rounds

## 2024-02-03 NOTE — PROGRESS NOTE ADULT - SUBJECTIVE AND OBJECTIVE BOX
THOMAS COPPOLA  78y  Male      Patient is a 78y old  Male who presents with a chief complaint of Blood per stool  SOB (31 Jan 2024 10:00)      INTERVAL HPI/OVERNIGHT EVENTS:  Patient seen and examined earlier this morning; patient denies any new complaints. He reports of blood in his stool, reports last episode was this AM. Denies abd pain, N/V.      REVIEW OF SYSTEMS:  CONSTITUTIONAL: No fever, weight loss, or fatigue  EYES: No eye pain, visual disturbances, or discharge  ENMT:  No difficulty hearing, tinnitus, vertigo; No sinus or throat pain  NECK: No pain or stiffness  RESPIRATORY: No cough, wheezing, chills or hemoptysis; No shortness of breath  CARDIOVASCULAR: No chest pain, palpitations, dizziness, or leg swelling  GASTROINTESTINAL: No abdominal or epigastric pain. No nausea, vomiting, or hematemesis; No diarrhea or constipation. +blood in stool  GENITOURINARY: No dysuria, frequency, hematuria, or incontinence  NEUROLOGICAL: No headaches, memory loss, loss of strength, numbness, or tremors  MUSCULOSKELETAL: No joint pain or swelling; No muscle, back, or extremity pain      T(C): 35.7 (01-31-24 @ 05:15), Max: 36.7 (01-30-24 @ 21:56)  HR: 64 (01-31-24 @ 05:15) (64 - 87)  BP: 117/49 (01-31-24 @ 05:15) (117/49 - 165/67)  RR: 18 (01-30-24 @ 21:56) (18 - 19)  SpO2: 87% (01-31-24 @ 09:32) (87% - 98%)    PHYSICAL EXAM:  GENERAL: NAD, well-groomed, well-developed  HEAD:  Atraumatic, Normocephalic  EYES: EOMI, PERRLA, conjunctiva and sclera clear  ENMT: No tonsillar erythema, exudates, or enlargement; Moist mucous membranes  NECK: Supple, No JVD, Normal thyroid  NERVOUS SYSTEM:  Alert & Oriented X3, Good concentration; Motor Strength 5/5 B/L upper and lower extremities  CHEST/LUNG: Clear to percussion bilaterally; No rales, rhonchi, wheezing, or rubs  HEART: Regular rate and rhythm; No murmurs, rubs, or gallops  ABDOMEN: Soft, Nontender, Nondistended; Bowel sounds present  EXTREMITIES:  2+ Peripheral Pulses, No clubbing, cyanosis, or edema      Consultant(s) Notes Reviewed:  [x ] YES  [ ] NO  Care Discussed with Consultants/Other Providers [ x] YES  [ ] NO    LAB:                        7.6    5.99  )-----------( 217      ( 31 Jan 2024 07:54 )             23.8     01-31    137  |  103  |  27<H>  ----------------------------<  153<H>  4.6   |  25  |  1.0    Ca    8.8      31 Jan 2024 07:54    TPro  5.7<L>  /  Alb  3.6  /  TBili  0.5  /  DBili  x   /  AST  11  /  ALT  7   /  AlkPhos  67  01-31    LIVER FUNCTIONS - ( 31 Jan 2024 07:54 )  Alb: 3.6 g/dL / Pro: 5.7 g/dL / ALK PHOS: 67 U/L / ALT: 7 U/L / AST: 11 U/L / GGT: x                       Drug Dosing Weight  Height (cm): 167.6 (30 Jan 2024 21:56)  Weight (kg): 71.7 (30 Jan 2024 16:13)  BMI (kg/m2): 25.5 (30 Jan 2024 21:56)  BSA (m2): 1.81 (30 Jan 2024 21:56)  Height (cm): 167.6 (01-30-24 @ 21:56)  Weight (kg): 71.7 (01-30-24 @ 16:13)  BMI (kg/m2): 25.5 (01-30-24 @ 21:56)  BSA (m2): 1.81 (01-30-24 @ 21:56)  CAPILLARY BLOOD GLUCOSE        I&O's Summary    Urinalysis Basic - ( 31 Jan 2024 07:54 )    Color: x / Appearance: x / SG: x / pH: x  Gluc: 153 mg/dL / Ketone: x  / Bili: x / Urobili: x   Blood: x / Protein: x / Nitrite: x   Leuk Esterase: x / RBC: x / WBC x   Sq Epi: x / Non Sq Epi: x / Bacteria: x        RADIOLOGY & ADDITIONAL TESTS:  Imaging Personally Reviewed:  [x] YES  [ ] NO    HEALTH ISSUES - PROBLEM Dx:          MEDS:  acetaminophen     Tablet .. 650 milliGRAM(s) Oral every 6 hours PRN  albuterol    90 MICROgram(s) HFA Inhaler 2 Puff(s) Inhalation every 4 hours PRN  albuterol/ipratropium for Nebulization 3 milliLiter(s) Nebulizer every 6 hours  ALPRAZolam 0.5 milliGRAM(s) Oral two times a day PRN  aMIOdarone    Tablet 200 milliGRAM(s) Oral daily  aspirin enteric coated 81 milliGRAM(s) Oral daily  atorvastatin 10 milliGRAM(s) Oral at bedtime  budesonide 160 MICROgram(s)/formoterol 4.5 MICROgram(s) Inhaler 2 Puff(s) Inhalation two times a day  furosemide    Tablet 20 milliGRAM(s) Oral daily  levothyroxine 88 MICROGram(s) Oral daily  methylPREDNISolone sodium succinate Injectable 40 milliGRAM(s) IV Push two times a day  metoprolol succinate ER 50 milliGRAM(s) Oral daily  nicotine -  14 mG/24Hr(s) Patch 14 Patch Transdermal daily  ondansetron Injectable 4 milliGRAM(s) IV Push every 6 hours PRN  pantoprazole  Injectable 40 milliGRAM(s) IV Push two times a day  senna 2 Tablet(s) Oral at bedtime PRN  sodium chloride 0.9%. 1000 milliLiter(s) IV Continuous <Continuous>  tamsulosin 0.4 milliGRAM(s) Oral at bedtime
THOMAS COPPOLA  78y  Male      Patient is a 78y old  Male who presents with a chief complaint of Blood per stool  SOB (31 Jan 2024 10:00)      INTERVAL HPI/OVERNIGHT EVENTS:  Patient seen and examined earlier this morning; patient denies any new complaints. Patient denies any new symptoms. Denies abd pain, N/V.      REVIEW OF SYSTEMS:  CONSTITUTIONAL: No fever, weight loss, or fatigue  EYES: No eye pain, visual disturbances, or discharge  ENMT:  No difficulty hearing, tinnitus, vertigo; No sinus or throat pain  NECK: No pain or stiffness  RESPIRATORY: No cough, wheezing, chills or hemoptysis; No shortness of breath  CARDIOVASCULAR: No chest pain, palpitations, dizziness, or leg swelling  GASTROINTESTINAL: No abdominal or epigastric pain. No nausea, vomiting, or hematemesis; No diarrhea or constipation. +blood in stool  GENITOURINARY: No dysuria, frequency, hematuria, or incontinence  NEUROLOGICAL: No headaches, memory loss, loss of strength, numbness, or tremors  MUSCULOSKELETAL: No joint pain or swelling; No muscle, back, or extremity pain      Vital Signs Last 24 Hrs  T(C): 36 (01 Feb 2024 05:10), Max: 36.6 (31 Jan 2024 14:30)  T(F): 96.8 (01 Feb 2024 05:10), Max: 97.9 (31 Jan 2024 14:30)  HR: 72 (01 Feb 2024 08:25) (56 - 72)  BP: 144/63 (01 Feb 2024 08:25) (120/58 - 155/63)  BP(mean): --  RR: 18 (01 Feb 2024 05:10) (18 - 18)  SpO2: 96% (01 Feb 2024 08:25) (94% - 98%)    Parameters below as of 01 Feb 2024 08:25  Patient On (Oxygen Delivery Method): nasal cannula  O2 Flow (L/min): 4  PHYSICAL EXAM:  GENERAL: NAD, well-groomed, well-developed  HEAD:  Atraumatic, Normocephalic  EYES: EOMI, PERRLA, conjunctiva and sclera clear  ENMT: No tonsillar erythema, exudates, or enlargement; Moist mucous membranes  NECK: Supple, No JVD, Normal thyroid  NERVOUS SYSTEM:  Alert & Oriented X3, Good concentration; Motor Strength 5/5 B/L upper and lower extremities  CHEST/LUNG: Clear to percussion bilaterally; No rales, rhonchi, wheezing, or rubs  HEART: Regular rate and rhythm; No murmurs, rubs, or gallops  ABDOMEN: Soft, Nontender, Nondistended; Bowel sounds present  EXTREMITIES:  2+ Peripheral Pulses, No clubbing, cyanosis, or edema      Consultant(s) Notes Reviewed:  [x ] YES  [ ] NO  Care Discussed with Consultants/Other Providers [ x] YES  [ ] NO    LAB:                        7.6    14.60 )-----------( 246      ( 01 Feb 2024 06:51 )             24.3   02-01    138  |  101  |  25<H>  ----------------------------<  138<H>  5.1<H>   |  28  |  1.1    Ca    9.6      01 Feb 2024 06:51    TPro  5.7<L>  /  Alb  3.6  /  TBili  0.5  /  DBili  x   /  AST  11  /  ALT  7   /  AlkPhos  67  01-31        Drug Dosing Weight  Height (cm): 167.6 (30 Jan 2024 21:56)  Weight (kg): 71.7 (30 Jan 2024 16:13)  BMI (kg/m2): 25.5 (30 Jan 2024 21:56)  BSA (m2): 1.81 (30 Jan 2024 21:56)  Height (cm): 167.6 (01-30-24 @ 21:56)  Weight (kg): 71.7 (01-30-24 @ 16:13)  BMI (kg/m2): 25.5 (01-30-24 @ 21:56)  BSA (m2): 1.81 (01-30-24 @ 21:56)  CAPILLARY BLOOD GLUCOSE        I&O's Summary    Urinalysis Basic - ( 31 Jan 2024 07:54 )    Color: x / Appearance: x / SG: x / pH: x  Gluc: 153 mg/dL / Ketone: x  / Bili: x / Urobili: x   Blood: x / Protein: x / Nitrite: x   Leuk Esterase: x / RBC: x / WBC x   Sq Epi: x / Non Sq Epi: x / Bacteria: x        RADIOLOGY & ADDITIONAL TESTS:  Imaging Personally Reviewed:  [x] YES  [ ] NO    HEALTH ISSUES - PROBLEM Dx:        MEDICATIONS  (STANDING):  albuterol/ipratropium for Nebulization 3 milliLiter(s) Nebulizer every 6 hours  aMIOdarone    Tablet 200 milliGRAM(s) Oral daily  aspirin enteric coated 81 milliGRAM(s) Oral daily  atorvastatin 10 milliGRAM(s) Oral at bedtime  budesonide 160 MICROgram(s)/formoterol 4.5 MICROgram(s) Inhaler 2 Puff(s) Inhalation two times a day  chlorhexidine 4% Liquid 1 Application(s) Topical <User Schedule>  furosemide    Tablet 20 milliGRAM(s) Oral daily  iron sucrose IVPB 250 milliGRAM(s) IV Intermittent once  levothyroxine 88 MICROGram(s) Oral daily  methylPREDNISolone sodium succinate Injectable 40 milliGRAM(s) IV Push two times a day  metoprolol succinate ER 50 milliGRAM(s) Oral <User Schedule>  nicotine -  14 mG/24Hr(s) Patch 14 Patch Transdermal daily  pantoprazole  Injectable 40 milliGRAM(s) IV Push two times a day  sodium chloride 0.9%. 1000 milliLiter(s) (50 mL/Hr) IV Continuous <Continuous>  tamsulosin 0.4 milliGRAM(s) Oral at bedtime    MEDICATIONS  (PRN):  acetaminophen     Tablet .. 650 milliGRAM(s) Oral every 6 hours PRN Temp greater or equal to 38C (100.4F), Mild Pain (1 - 3)  albuterol    90 MICROgram(s) HFA Inhaler 2 Puff(s) Inhalation every 4 hours PRN Shortness of Breath and/or Wheezing  ALPRAZolam 0.5 milliGRAM(s) Oral two times a day PRN for anxiety  ondansetron Injectable 4 milliGRAM(s) IV Push every 6 hours PRN Nausea  senna 2 Tablet(s) Oral at bedtime PRN Constipation  
THOMAS COPPOLA  78y  Male      Patient is a 78y old  Male who presents with a chief complaint of Blood per stool  SOB (31 Jan 2024 10:00)      INTERVAL HPI/OVERNIGHT EVENTS:  Patient seen and examined earlier this morning; patient denies any new complaints. Patient denies any new symptoms. Denies abd pain, N/V. Spoke to his daughter on the phone.      REVIEW OF SYSTEMS:  CONSTITUTIONAL: No fever, weight loss, or fatigue  EYES: No eye pain, visual disturbances, or discharge  ENMT:  No difficulty hearing, tinnitus, vertigo; No sinus or throat pain  NECK: No pain or stiffness  RESPIRATORY: No cough, wheezing, chills or hemoptysis; No shortness of breath  CARDIOVASCULAR: No chest pain, palpitations, dizziness, or leg swelling  GASTROINTESTINAL: No abdominal or epigastric pain. No nausea, vomiting, or hematemesis; No diarrhea or constipation. +blood in stool  GENITOURINARY: No dysuria, frequency, hematuria, or incontinence  NEUROLOGICAL: No headaches, memory loss, loss of strength, numbness, or tremors  MUSCULOSKELETAL: No joint pain or swelling; No muscle, back, or extremity pain      Vital Signs Last 24 Hrs  T(C): 36.4 (03 Feb 2024 06:08), Max: 36.4 (02 Feb 2024 14:39)  T(F): 97.6 (03 Feb 2024 06:08), Max: 97.6 (02 Feb 2024 14:39)  HR: 55 (03 Feb 2024 06:08) (55 - 65)  BP: 119/77 (03 Feb 2024 06:08) (119/77 - 169/74)  BP(mean): --  RR: 18 (03 Feb 2024 06:08) (18 - 21)  SpO2: 92% (03 Feb 2024 08:14) (91% - 100%)    Parameters below as of 03 Feb 2024 08:14  Patient On (Oxygen Delivery Method): nasal cannula  O2 Flow (L/min): 2    PHYSICAL EXAM:  GENERAL: NAD, well-groomed, well-developed  HEAD:  Atraumatic, Normocephalic  EYES: EOMI, PERRLA, conjunctiva and sclera clear  ENMT: No tonsillar erythema, exudates, or enlargement; Moist mucous membranes  NECK: Supple, No JVD, Normal thyroid  NERVOUS SYSTEM:  Alert & Oriented X3, Good concentration; Motor Strength 5/5 B/L upper and lower extremities  CHEST/LUNG: Clear to percussion bilaterally; No rales, rhonchi, wheezing, or rubs  HEART: Regular rate and rhythm; No murmurs, rubs, or gallops  ABDOMEN: Soft, Nontender, Nondistended; Bowel sounds present  EXTREMITIES:  2+ Peripheral Pulses, No clubbing, cyanosis, or edema      Consultant(s) Notes Reviewed:  [x ] YES  [ ] NO  Care Discussed with Consultants/Other Providers [ x] YES  [ ] NO    LAB:                        7.4    x     )-----------( x        ( 03 Feb 2024 07:08 )             23.0         Drug Dosing Weight  Height (cm): 167.6 (30 Jan 2024 21:56)  Weight (kg): 71.7 (30 Jan 2024 16:13)  BMI (kg/m2): 25.5 (30 Jan 2024 21:56)  BSA (m2): 1.81 (30 Jan 2024 21:56)  Height (cm): 167.6 (01-30-24 @ 21:56)  Weight (kg): 71.7 (01-30-24 @ 16:13)  BMI (kg/m2): 25.5 (01-30-24 @ 21:56)  BSA (m2): 1.81 (01-30-24 @ 21:56)  CAPILLARY BLOOD GLUCOSE        I&O's Summary    Urinalysis Basic - ( 31 Jan 2024 07:54 )    Color: x / Appearance: x / SG: x / pH: x  Gluc: 153 mg/dL / Ketone: x  / Bili: x / Urobili: x   Blood: x / Protein: x / Nitrite: x   Leuk Esterase: x / RBC: x / WBC x   Sq Epi: x / Non Sq Epi: x / Bacteria: x        RADIOLOGY & ADDITIONAL TESTS:  Imaging Personally Reviewed:  [x] YES  [ ] NO    HEALTH ISSUES - PROBLEM Dx:        MEDICATIONS  (STANDING):  albuterol/ipratropium for Nebulization 3 milliLiter(s) Nebulizer every 6 hours  aMIOdarone    Tablet 200 milliGRAM(s) Oral daily  aspirin enteric coated 81 milliGRAM(s) Oral daily  atorvastatin 10 milliGRAM(s) Oral at bedtime  budesonide 160 MICROgram(s)/formoterol 4.5 MICROgram(s) Inhaler 2 Puff(s) Inhalation two times a day  chlorhexidine 4% Liquid 1 Application(s) Topical <User Schedule>  furosemide    Tablet 20 milliGRAM(s) Oral daily  iron sucrose IVPB 200 milliGRAM(s) IV Intermittent every 24 hours  levothyroxine 88 MICROGram(s) Oral daily  methylPREDNISolone sodium succinate Injectable 40 milliGRAM(s) IV Push two times a day  pantoprazole  Injectable 40 milliGRAM(s) IV Push two times a day  sodium chloride 0.9%. 1000 milliLiter(s) (50 mL/Hr) IV Continuous <Continuous>  tamsulosin 0.4 milliGRAM(s) Oral at bedtime    MEDICATIONS  (PRN):  albuterol    90 MICROgram(s) HFA Inhaler 2 Puff(s) Inhalation every 4 hours PRN Shortness of Breath and/or Wheezing  ALPRAZolam 0.5 milliGRAM(s) Oral two times a day PRN for anxiety  senna 2 Tablet(s) Oral at bedtime PRN Constipation  
THOMAS COPPOLA  78y  Male      Patient is a 78y old  Male who presents with a chief complaint of Blood per stool  SOB (31 Jan 2024 10:00)      INTERVAL HPI/OVERNIGHT EVENTS:  Patient seen and examined earlier this morning; patient denies any new complaints. Patient denies any new symptoms. Denies abd pain, N/V. he reports he finished the go-lytely.       REVIEW OF SYSTEMS:  CONSTITUTIONAL: No fever, weight loss, or fatigue  EYES: No eye pain, visual disturbances, or discharge  ENMT:  No difficulty hearing, tinnitus, vertigo; No sinus or throat pain  NECK: No pain or stiffness  RESPIRATORY: No cough, wheezing, chills or hemoptysis; No shortness of breath  CARDIOVASCULAR: No chest pain, palpitations, dizziness, or leg swelling  GASTROINTESTINAL: No abdominal or epigastric pain. No nausea, vomiting, or hematemesis; No diarrhea or constipation. +blood in stool  GENITOURINARY: No dysuria, frequency, hematuria, or incontinence  NEUROLOGICAL: No headaches, memory loss, loss of strength, numbness, or tremors  MUSCULOSKELETAL: No joint pain or swelling; No muscle, back, or extremity pain      Vital Signs Last 24 Hrs  T(C): 35.7 (02 Feb 2024 06:08), Max: 36.2 (01 Feb 2024 20:20)  T(F): 96.2 (02 Feb 2024 06:08), Max: 97.1 (01 Feb 2024 20:20)  HR: 62 (02 Feb 2024 09:00) (58 - 63)  BP: 169/74 (02 Feb 2024 09:00) (126/62 - 170/63)  BP(mean): --  RR: 18 (02 Feb 2024 09:00) (18 - 18)  SpO2: 95% (02 Feb 2024 08:34) (68% - 100%)    Parameters below as of 02 Feb 2024 08:34    O2 Flow (L/min): 2  PHYSICAL EXAM:  GENERAL: NAD, well-groomed, well-developed  HEAD:  Atraumatic, Normocephalic  EYES: EOMI, PERRLA, conjunctiva and sclera clear  ENMT: No tonsillar erythema, exudates, or enlargement; Moist mucous membranes  NECK: Supple, No JVD, Normal thyroid  NERVOUS SYSTEM:  Alert & Oriented X3, Good concentration; Motor Strength 5/5 B/L upper and lower extremities  CHEST/LUNG: Clear to percussion bilaterally; No rales, rhonchi, wheezing, or rubs  HEART: Regular rate and rhythm; No murmurs, rubs, or gallops  ABDOMEN: Soft, Nontender, Nondistended; Bowel sounds present  EXTREMITIES:  2+ Peripheral Pulses, No clubbing, cyanosis, or edema      Consultant(s) Notes Reviewed:  [x ] YES  [ ] NO  Care Discussed with Consultants/Other Providers [ x] YES  [ ] NO    LAB:                        8.0    12.71 )-----------( 256      ( 02 Feb 2024 06:12 )             24.9   02-02    139  |  99  |  24<H>  ----------------------------<  104<H>  4.3   |  30  |  1.2    Ca    9.2      02 Feb 2024 06:12    TPro  6.2  /  Alb  3.8  /  TBili  0.5  /  DBili  x   /  AST  16  /  ALT  13  /  AlkPhos  70  02-02        Drug Dosing Weight  Height (cm): 167.6 (30 Jan 2024 21:56)  Weight (kg): 71.7 (30 Jan 2024 16:13)  BMI (kg/m2): 25.5 (30 Jan 2024 21:56)  BSA (m2): 1.81 (30 Jan 2024 21:56)  Height (cm): 167.6 (01-30-24 @ 21:56)  Weight (kg): 71.7 (01-30-24 @ 16:13)  BMI (kg/m2): 25.5 (01-30-24 @ 21:56)  BSA (m2): 1.81 (01-30-24 @ 21:56)  CAPILLARY BLOOD GLUCOSE        I&O's Summary    Urinalysis Basic - ( 31 Jan 2024 07:54 )    Color: x / Appearance: x / SG: x / pH: x  Gluc: 153 mg/dL / Ketone: x  / Bili: x / Urobili: x   Blood: x / Protein: x / Nitrite: x   Leuk Esterase: x / RBC: x / WBC x   Sq Epi: x / Non Sq Epi: x / Bacteria: x        RADIOLOGY & ADDITIONAL TESTS:  Imaging Personally Reviewed:  [x] YES  [ ] NO    HEALTH ISSUES - PROBLEM Dx:        MEDICATIONS  (STANDING):  albuterol/ipratropium for Nebulization 3 milliLiter(s) Nebulizer every 6 hours  aMIOdarone    Tablet 200 milliGRAM(s) Oral daily  aspirin enteric coated 81 milliGRAM(s) Oral daily  atorvastatin 10 milliGRAM(s) Oral at bedtime  budesonide 160 MICROgram(s)/formoterol 4.5 MICROgram(s) Inhaler 2 Puff(s) Inhalation two times a day  chlorhexidine 4% Liquid 1 Application(s) Topical <User Schedule>  furosemide    Tablet 20 milliGRAM(s) Oral daily  iron sucrose IVPB 200 milliGRAM(s) IV Intermittent every 24 hours  levothyroxine 88 MICROGram(s) Oral daily  methylPREDNISolone sodium succinate Injectable 40 milliGRAM(s) IV Push two times a day  metoprolol succinate ER 50 milliGRAM(s) Oral <User Schedule>  nicotine -  14 mG/24Hr(s) Patch 14 Patch Transdermal daily  pantoprazole  Injectable 40 milliGRAM(s) IV Push two times a day  sodium chloride 0.9%. 1000 milliLiter(s) (50 mL/Hr) IV Continuous <Continuous>  tamsulosin 0.4 milliGRAM(s) Oral at bedtime    MEDICATIONS  (PRN):  acetaminophen     Tablet .. 650 milliGRAM(s) Oral every 6 hours PRN Temp greater or equal to 38C (100.4F), Mild Pain (1 - 3)  albuterol    90 MICROgram(s) HFA Inhaler 2 Puff(s) Inhalation every 4 hours PRN Shortness of Breath and/or Wheezing  ALPRAZolam 0.5 milliGRAM(s) Oral two times a day PRN for anxiety  ondansetron Injectable 4 milliGRAM(s) IV Push every 6 hours PRN Nausea  senna 2 Tablet(s) Oral at bedtime PRN Constipation  
78yMale  Being followed for bloody stools  Interval history: Patient denies nausea, vomiting, hematemesis, melena, blood in stool, diarrhea, constipation, abdominal pain. patient feeling much better without further bleeding.      PAST MEDICAL & SURGICAL HISTORY:   Congestive heart failure      High blood cholesterol      Hypertension      Bladder cancer      COPD (chronic obstructive pulmonary disease)      Afib      No significant past surgical history                Social History: No smoking. No alcohol. No illegal drug use.            MEDICATIONS  (STANDING):  albuterol/ipratropium for Nebulization 3 milliLiter(s) Nebulizer every 6 hours  aMIOdarone    Tablet 200 milliGRAM(s) Oral daily  aspirin enteric coated 81 milliGRAM(s) Oral daily  atorvastatin 10 milliGRAM(s) Oral at bedtime  budesonide 160 MICROgram(s)/formoterol 4.5 MICROgram(s) Inhaler 2 Puff(s) Inhalation two times a day  chlorhexidine 4% Liquid 1 Application(s) Topical <User Schedule>  furosemide    Tablet 20 milliGRAM(s) Oral daily  levothyroxine 88 MICROGram(s) Oral daily  methylPREDNISolone sodium succinate Injectable 40 milliGRAM(s) IV Push two times a day  metoprolol succinate ER 50 milliGRAM(s) Oral <User Schedule>  nicotine -  14 mG/24Hr(s) Patch 14 Patch Transdermal daily  pantoprazole  Injectable 40 milliGRAM(s) IV Push two times a day  polyethylene glycol/electrolyte Solution. 4000 milliLiter(s) Oral once  sodium chloride 0.9%. 1000 milliLiter(s) (50 mL/Hr) IV Continuous <Continuous>  tamsulosin 0.4 milliGRAM(s) Oral at bedtime    MEDICATIONS  (PRN):  acetaminophen     Tablet .. 650 milliGRAM(s) Oral every 6 hours PRN Temp greater or equal to 38C (100.4F), Mild Pain (1 - 3)  albuterol    90 MICROgram(s) HFA Inhaler 2 Puff(s) Inhalation every 4 hours PRN Shortness of Breath and/or Wheezing  ALPRAZolam 0.5 milliGRAM(s) Oral two times a day PRN for anxiety  ondansetron Injectable 4 milliGRAM(s) IV Push every 6 hours PRN Nausea  senna 2 Tablet(s) Oral at bedtime PRN Constipation      Allergies:   No Known Allergies              REVIEW OF SYSTEMS:  General:  No weight loss, fevers, or chills.  Eyes:  No reported pain or visual changes  ENT:  No sore throat or runny nose.  NECK: No stiffness   CV:  No chest pain or palpitations.  Resp:  No shortness of breath, cough  GI:  No abdominal pain, nausea, vomiting, dysphagia, diarrhea or constipation. No rectal bleeding, melena, or hematemesis.  Muscle:  No aches or weakness  Neuro:  No tingling, numbness       VITAL SIGNS:   T(F): 96.8 (24 @ 14:11), Max: 96.8 (24 @ 05:10)  HR: 63 (24 @ 14:11) (56 - 72)  BP: 137/60 (24 @ 14:11) (120/58 - 155/63)  RR: 18 (24 @ 05:10) (18 - 18)  SpO2: 100% (24 @ 14:11) (96% - 100%)    PHYSICAL EXAM:  GENERAL: AAOx3, no acute distress.  HEAD:  Atraumatic, Normocephalic  EYES: conjunctiva and sclera clear  NECK: Supple, no JVD or thyromegaly  CHEST/LUNG: Clear to auscultation bilaterally; No wheeze, rhonchi, or rales  HEART: Regular rate and rhythm; normal S1, S2, No murmurs.  ABDOMEN: Soft, nontender, nondistended; Bowel sounds present  NEUROLOGY: No asterixis or tremor.   SKIN: Intact, no jaundice            LABS:                        7.6    14.60 )-----------( 246      ( 2024 06:51 )             24.3         138  |  101  |  25<H>  ----------------------------<  138<H>  5.1<H>   |  28  |  1.1    Ca    9.6      2024 06:51    TPro  5.7<L>  /  Alb  3.6  /  TBili  0.5  /  DBili  x   /  AST  11  /  ALT  7   /  AlkPhos  67      LIVER FUNCTIONS - ( 2024 07:54 )  Alb: 3.6 g/dL / Pro: 5.7 g/dL / ALK PHOS: 67 U/L / ALT: 7 U/L / AST: 11 U/L / GGT: x               IMAGING:    < from: Xray Chest 1 View- PORTABLE-Urgent (24 @ 17:24) >    ACC: [id number] EXAM:  XR CHEST PORTABLE URGENT 1V   ORDERED BY: STEVE DRUMMOND     PROCEDURE DATE:  2024          INTERPRETATION:  Clinical History / Reason for exam: Shortness of breath    Comparison : Chest radiograph 2018.    Technique/Positionin AP views of the chest.    Findings:    Support devices: None.    Cardiac/mediastinum/hilum: Unremarkable.    Lung parenchyma/Pleura: Bibasilar opacities with associated congestive   changes    Skeleton/soft tissues: Degenerative osseous changes.      Impression:    Bibasilar opacities with associated congestive changes..    --- End of Report ---          ÁNGELA RENE MD; Resident Radiologist  This document has been electronically signed.  MARY AKBAR MD; Attending Radiologist  This document has been electronically signed. 2024 10:19AM    < end of copied text >

## 2024-02-03 NOTE — PROGRESS NOTE ADULT - REASON FOR ADMISSION
Blood per stool  SOB

## 2024-02-04 ENCOUNTER — TRANSCRIPTION ENCOUNTER (OUTPATIENT)
Age: 79
End: 2024-02-04

## 2024-02-04 VITALS — OXYGEN SATURATION: 93 % | RESPIRATION RATE: 18 BRPM

## 2024-02-04 LAB
HCT VFR BLD CALC: 30.3 % — LOW (ref 42–52)
HGB BLD-MCNC: 10.2 G/DL — LOW (ref 14–18)
MCHC RBC-ENTMCNC: 30.5 PG — SIGNIFICANT CHANGE UP (ref 27–31)
MCHC RBC-ENTMCNC: 33.7 G/DL — SIGNIFICANT CHANGE UP (ref 32–37)
MCV RBC AUTO: 90.7 FL — SIGNIFICANT CHANGE UP (ref 80–94)
NRBC # BLD: 0 /100 WBCS — SIGNIFICANT CHANGE UP (ref 0–0)
PLATELET # BLD AUTO: 246 K/UL — SIGNIFICANT CHANGE UP (ref 130–400)
PMV BLD: 10.8 FL — HIGH (ref 7.4–10.4)
RBC # BLD: 3.34 M/UL — LOW (ref 4.7–6.1)
RBC # FLD: 16.8 % — HIGH (ref 11.5–14.5)
WBC # BLD: 17.22 K/UL — HIGH (ref 4.8–10.8)
WBC # FLD AUTO: 17.22 K/UL — HIGH (ref 4.8–10.8)

## 2024-02-04 PROCEDURE — 99239 HOSP IP/OBS DSCHRG MGMT >30: CPT

## 2024-02-04 RX ORDER — SENNA PLUS 8.6 MG/1
2 TABLET ORAL
Qty: 0 | Refills: 0 | DISCHARGE
Start: 2024-02-04

## 2024-02-04 RX ORDER — CLOPIDOGREL BISULFATE 75 MG/1
75 TABLET, FILM COATED ORAL DAILY
Refills: 0 | Status: DISCONTINUED | OUTPATIENT
Start: 2024-02-04 | End: 2024-02-04

## 2024-02-04 RX ORDER — FERROUS FUMARATE 350(115)MG
1 TABLET ORAL
Qty: 30 | Refills: 0
Start: 2024-02-04 | End: 2024-03-04

## 2024-02-04 RX ADMIN — AMIODARONE HYDROCHLORIDE 200 MILLIGRAM(S): 400 TABLET ORAL at 05:35

## 2024-02-04 RX ADMIN — CLOPIDOGREL BISULFATE 75 MILLIGRAM(S): 75 TABLET, FILM COATED ORAL at 11:18

## 2024-02-04 RX ADMIN — Medication 88 MICROGRAM(S): at 05:37

## 2024-02-04 RX ADMIN — Medication 20 MILLIGRAM(S): at 05:38

## 2024-02-04 RX ADMIN — PANTOPRAZOLE SODIUM 40 MILLIGRAM(S): 20 TABLET, DELAYED RELEASE ORAL at 05:37

## 2024-02-04 RX ADMIN — BUDESONIDE AND FORMOTEROL FUMARATE DIHYDRATE 2 PUFF(S): 160; 4.5 AEROSOL RESPIRATORY (INHALATION) at 11:17

## 2024-02-04 RX ADMIN — Medication 3 MILLILITER(S): at 07:34

## 2024-02-04 RX ADMIN — Medication 81 MILLIGRAM(S): at 11:18

## 2024-02-04 RX ADMIN — Medication 40 MILLIGRAM(S): at 07:36

## 2024-02-04 NOTE — DISCHARGE NOTE PROVIDER - CARE PROVIDERS DIRECT ADDRESSES
,[email or web address]

## 2024-02-04 NOTE — DISCHARGE NOTE PROVIDER - NSDCFUSCHEDAPPT_GEN_ALL_CORE_FT
Vladimir Jose  [facility]  PULMMED [address]  Scheduled Appointment: 03/12/2024    Derik Delong  [facility]  CARDIOLOGY [address]  Scheduled Appointment: 03/28/2024

## 2024-02-04 NOTE — DISCHARGE NOTE NURSING/CASE MANAGEMENT/SOCIAL WORK - NSDCPEEMAIL_GEN_ALL_CORE
[facility] for Tobacco [facility] email [email or web address]

## 2024-02-04 NOTE — DISCHARGE NOTE PROVIDER - CARE PROVIDER_API CALL
Nahid Gerber  Internal Medicine  [address] [place] [address]  Phone: [phone]  Fax: [phone]  Follow Up Time: 1 week    Vladimir Jose  Pulmonary Disease  [address] [place] [address]  Phone: [phone]  Fax: [phone]  Follow Up Time: 1 month    Kim Dickson  Gastroenterology  [address] [place] [address]  Phone: [phone]  Fax: [phone]  Follow Up Time: 1 month    Derik Delong  Cardiovascular Disease  [address] [place] [address]  Phone: [phone]  Fax: [phone]  Follow Up Time: 2 weeks    Avila Zhang  Medical Oncology  [address] [place] [address]  Phone: [phone]  Fax: [phone]  Follow Up Time: 2 weeks

## 2024-02-04 NOTE — DISCHARGE NOTE NURSING/CASE MANAGEMENT/SOCIAL WORK - NSDCPEWEB_GEN_ALL_CORE
[facility] for Tobacco Control website --- [email or web address] website --- [email or web address]

## 2024-02-04 NOTE — DISCHARGE NOTE PROVIDER - PROVIDER TOKENS
PROVIDER:[TOKEN:[[id number]],FOLLOWUP:[1 week]],PROVIDER:[TOKEN:[[id number]],FOLLOWUP:[1 month]],PROVIDER:[TOKEN:[[id number]],FOLLOWUP:[1 month]],PROVIDER:[TOKEN:[[id number]],FOLLOWUP:[2 weeks]],PROVIDER:[TOKEN:[[id number]],FOLLOWUP:[2 weeks]]

## 2024-02-04 NOTE — DISCHARGE NOTE PROVIDER - HOSPITAL COURSE
77 yo male with hx of CHF, atrial fib, HTN, HLD, and copd presenting with dark, and bright red blood per stool likely 2/2 GIB. He was found to have acute on chronic amenia, iron deficiency anemia. GI evaluated him; his plavix was help. He underwent colonoscopy which showed Normal mucosa in the terminal ileum. AVMs in the cecum. (Hemostasis, Endoclip). Diverticulosis of the sigmoid colon. Internal hemorrhoids. The colon was otherwise unremarkable. He was also transfused 1U packed red blood cells. His hemoglobin in 10.2 this morning. He is currently stable for a hospital discharge.    Things to follow up:  -PCP follow up in 1-2 weeks  -Continue taking home medications  -Take prednisone 40mg daily for 4 more days  -Follow up with Dr. Delong in 2 weeks as outpatient  -Follow up with Dr. Danielle in 2 weeks as outpatient  -Follow up with gastroenterologist Dr. Kim Dickson MD in 4 weeks as outpatient    Patient was seen and examined at bedside; patient denies any complaints. Discharge planning was discussed.  Vital Signs Last 24 Hrs  T(C): 36.2 (04 Feb 2024 05:14), Max: 36.2 (04 Feb 2024 05:14)  T(F): 97.1 (04 Feb 2024 05:14), Max: 97.1 (04 Feb 2024 05:14)  HR: 81 (04 Feb 2024 05:14) (63 - 81)  BP: 141/63 (04 Feb 2024 05:14) (118/54 - 147/70)  BP(mean): --  RR: 18 (04 Feb 2024 09:28) (18 - 18)  SpO2: 93% (04 Feb 2024 09:28) (93% - 93%)    Parameters below as of 04 Feb 2024 09:28  Patient On (Oxygen Delivery Method): nasal cannula  O2 Flow (L/min): 2    GENERAL: NAD, well-groomed, well-developed  HEAD:  Atraumatic, Normocephalic  EYES: EOMI, PERRLA, conjunctiva and sclera clear  ENMT: No tonsillar erythema, exudates, or enlargement; Moist mucous membranes  NECK: Supple, No JVD, Normal thyroid  NERVOUS SYSTEM:  Alert & Oriented X3, Good concentration; Motor Strength 5/5 B/L upper and lower extremities  CHEST/LUNG: Clear to percussion bilaterally; No rales, rhonchi, wheezing, or rubs  HEART: Regular rate and rhythm; No murmurs, rubs, or gallops  ABDOMEN: Soft, Nontender, Nondistended; Bowel sounds present  EXTREMITIES:  2+ Peripheral Pulses, No clubbing, cyanosis, or edema

## 2024-02-04 NOTE — DISCHARGE NOTE PROVIDER - NSDCCPCAREPLAN_GEN_ALL_CORE_FT
PRINCIPAL DISCHARGE DIAGNOSIS  Diagnosis: Lower GI bleed  Assessment and Plan of Treatment: 77 yo male with hx of CHF, atrial fib, HTN, HLD, and copd presenting with dark, and bright red blood per stool likely 2/2 GIB. He was found to have acute on chronic amenia, iron deficiency anemia. GI evaluated him; his plavix was help. He underwent colonoscopy which showed Normal mucosa in the terminal ileum. AVMs in the cecum. (Hemostasis, Endoclip). Diverticulosis of the sigmoid colon. Internal hemorrhoids. The colon was otherwise unremarkable. He was also transfused 1U packed red blood cells. His hemoglobin in 10.2 this morning. He is currently stable for a hospital discharge.  Things to follow up:  -PCP follow up in 1-2 weeks  -Continue taking home medications  -Take prednisone 40mg daily for 4 more days  -Follow up with Dr. Delong in 2 weeks as outpatient  -Follow up with Dr. Danielle in 2 weeks as outpatient  -Follow up with gastroenterologist Dr. Kim Dickson MD in 4 weeks as outpatient

## 2024-02-04 NOTE — DISCHARGE NOTE NURSING/CASE MANAGEMENT/SOCIAL WORK - NSDCPEFALRISK_GEN_ALL_CORE
For information on Fall & Injury Prevention, visit: [email or web address] OR  [email or web address] OR  https://www.cdc.gov/steadi/patient.html

## 2024-02-04 NOTE — DISCHARGE NOTE PROVIDER - NSDCFUADDINST_GEN_ALL_CORE_FT
Things to follow up:  -PCP follow up in 1-2 weeks  -Continue taking home medications  -Take prednisone 40mg daily for 4 more days  -Follow up with Dr. Delong in 2 weeks as outpatient  -Follow up with Dr. Danielle in 2 weeks as outpatient  -Follow up with gastroenterologist Dr. Kim Dickson MD in 4 weeks as outpatient

## 2024-02-04 NOTE — DISCHARGE NOTE NURSING/CASE MANAGEMENT/SOCIAL WORK - PATIENT PORTAL LINK FT
You can access the FollowMyHealth Patient Portal offered by Health system by registering at the following website: [email or web address]. By joining [facility] [facility]’s FollowMyHealth portal, you will also be able to view your health information using other applications (apps) compatible with our system.

## 2024-02-04 NOTE — DISCHARGE NOTE PROVIDER - NSDCMRMEDTOKEN_GEN_ALL_CORE_FT
Advair Diskus 250 mcg-50 mcg inhalation powder: 1 inhaled 2 times a day  amiodarone 200 mg oral tablet:   aspirin 81 mg oral tablet, chewable:   atorvastatin 10 mg oral tablet: 1 tab(s) orally once a day  clopidogrel 75 mg oral tablet:   ferrous fumarate 325 mg (106 mg elemental iron) oral tablet: 1 tab(s) orally once a day  furosemide 20 mg oral tablet: 1 tab(s) orally once a day  Klor-Con 10 mEq oral tablet, extended release:   levothyroxine 100 mcg (0.1 mg) oral capsule: 1 cap(s) orally once a day  Metoprolol Succinate ER 50 mg oral tablet, extended release:   predniSONE 20 mg oral tablet: 2 tab(s) orally once a day  senna leaf extract oral tablet: 2 tab(s) orally once a day (at bedtime) As needed Constipation  tamsulosin 0.4 mg oral capsule: 1 cap(s) orally once a day  Xanax 0.5 mg oral tablet: 1 tab(s) orally 2 times a day as needed for  anxiety

## 2024-02-05 ENCOUNTER — NON-APPOINTMENT (OUTPATIENT)
Age: 79
End: 2024-02-05

## 2024-02-05 PROBLEM — I48.91 UNSPECIFIED ATRIAL FIBRILLATION: Chronic | Status: ACTIVE | Noted: 2024-01-30

## 2024-02-05 NOTE — CDI QUERY NOTE - NSCDIOTHERTXTBX_GEN_ALL_CORE_HH
Based on your professional judgment and clinical indicators, can the Acute on chronic anemia, be further specified as:     ----- Acute on Chronic blood anemia associated with GI bleed   ----- Acute on Chronic anemia associated not associated with GI Bleed  ----- Other (please specify e.g.  iron deficiency anemia)  ----- Clinically unable to determine     Clinical Indicator:   Labs:  H/H:  =   9.1/28.9   (1/10/24  16:33)                      =   8.3/26.2   ( 24   22:00)                     =   7.6/23.8   ( 24)                     =   7.6/24.3   ( 24)                     =   8.0/24.9   (24)                     = 7.4/23       (2/3/24)                     =   10.2/30.3 ()           Colonoscopy: 24:   Findings:  Two AVMs with intermittent oozing noted in the cecum. Hemostasis was performed  using APC. There was mild intermittent oozing noted with mucosal friability at the site of larger cecal AVM despite APC, therefore two endoclips were noted.     Documentation:   2/3/24: Progress Note:  Attending:  Assessment:   #dark, and bright red blood per stool likely 2/2 GIB     #Acute on chronic amenia, iron deficiency anemia  - hgb 9.1-->8.3-->7.6--> 7.3    24: DC Note:  Attendin yo male -- presenting with dark, and bright red blood per stool likely 2/2 GIB. He was found to have acute on chronic amenia, iron deficiency anemia.     He underwent colonoscopy which showed Normal mucosa in the terminal ileum. AVMs in the cecum. (Hemostasis, Endoclip).    Meds: Transfused 1U PRBC (2/3/24)    Thank you.

## 2024-02-11 ENCOUNTER — EMERGENCY (EMERGENCY)
Facility: HOSPITAL | Age: 79
LOS: 0 days | Discharge: HOME | End: 2024-02-11
Attending: EMERGENCY MEDICINE
Payer: MEDICARE

## 2024-02-11 VITALS
TEMPERATURE: 99 F | RESPIRATION RATE: 20 BRPM | WEIGHT: 158.07 LBS | OXYGEN SATURATION: 95 % | HEIGHT: 66 IN | SYSTOLIC BLOOD PRESSURE: 136 MMHG | DIASTOLIC BLOOD PRESSURE: 64 MMHG | HEART RATE: 87 BPM

## 2024-02-11 VITALS
DIASTOLIC BLOOD PRESSURE: 67 MMHG | RESPIRATION RATE: 18 BRPM | TEMPERATURE: 98 F | SYSTOLIC BLOOD PRESSURE: 149 MMHG | HEART RATE: 70 BPM | OXYGEN SATURATION: 96 %

## 2024-02-11 DIAGNOSIS — F17.200 NICOTINE DEPENDENCE, UNSPECIFIED, UNCOMPLICATED: ICD-10-CM

## 2024-02-11 DIAGNOSIS — Z88.0 ALLERGY STATUS TO PENICILLIN: ICD-10-CM

## 2024-02-11 DIAGNOSIS — R39.15 URGENCY OF URINATION: ICD-10-CM

## 2024-02-11 DIAGNOSIS — M54.50 LOW BACK PAIN, UNSPECIFIED: ICD-10-CM

## 2024-02-11 DIAGNOSIS — J44.9 CHRONIC OBSTRUCTIVE PULMONARY DISEASE, UNSPECIFIED: ICD-10-CM

## 2024-02-11 DIAGNOSIS — G89.29 OTHER CHRONIC PAIN: ICD-10-CM

## 2024-02-11 LAB
ALBUMIN SERPL ELPH-MCNC: 3.6 G/DL — SIGNIFICANT CHANGE UP (ref 3.5–5.2)
ALP SERPL-CCNC: 72 U/L — SIGNIFICANT CHANGE UP (ref 30–115)
ALT FLD-CCNC: 16 U/L — SIGNIFICANT CHANGE UP (ref 0–41)
ANION GAP SERPL CALC-SCNC: 9 MMOL/L — SIGNIFICANT CHANGE UP (ref 7–14)
APPEARANCE UR: CLEAR — SIGNIFICANT CHANGE UP
AST SERPL-CCNC: 44 U/L — HIGH (ref 0–41)
BASOPHILS # BLD AUTO: 0.03 K/UL — SIGNIFICANT CHANGE UP (ref 0–0.2)
BASOPHILS NFR BLD AUTO: 0.2 % — SIGNIFICANT CHANGE UP (ref 0–1)
BILIRUB SERPL-MCNC: 0.7 MG/DL — SIGNIFICANT CHANGE UP (ref 0.2–1.2)
BILIRUB UR-MCNC: NEGATIVE — SIGNIFICANT CHANGE UP
BUN SERPL-MCNC: 17 MG/DL — SIGNIFICANT CHANGE UP (ref 10–20)
CALCIUM SERPL-MCNC: 8.3 MG/DL — LOW (ref 8.4–10.5)
CHLORIDE SERPL-SCNC: 97 MMOL/L — LOW (ref 98–110)
CO2 SERPL-SCNC: 28 MMOL/L — SIGNIFICANT CHANGE UP (ref 17–32)
COLOR SPEC: YELLOW — SIGNIFICANT CHANGE UP
CREAT SERPL-MCNC: 1 MG/DL — SIGNIFICANT CHANGE UP (ref 0.7–1.5)
DIFF PNL FLD: NEGATIVE — SIGNIFICANT CHANGE UP
EGFR: 76 ML/MIN/1.73M2 — SIGNIFICANT CHANGE UP
EOSINOPHIL # BLD AUTO: 0.07 K/UL — SIGNIFICANT CHANGE UP (ref 0–0.7)
EOSINOPHIL NFR BLD AUTO: 0.6 % — SIGNIFICANT CHANGE UP (ref 0–8)
GLUCOSE SERPL-MCNC: 71 MG/DL — SIGNIFICANT CHANGE UP (ref 70–99)
GLUCOSE UR QL: NEGATIVE MG/DL — SIGNIFICANT CHANGE UP
HCT VFR BLD CALC: 35 % — LOW (ref 42–52)
HGB BLD-MCNC: 11.3 G/DL — LOW (ref 14–18)
IMM GRANULOCYTES NFR BLD AUTO: 1.2 % — HIGH (ref 0.1–0.3)
KETONES UR-MCNC: 15 MG/DL
LEUKOCYTE ESTERASE UR-ACNC: NEGATIVE — SIGNIFICANT CHANGE UP
LYMPHOCYTES # BLD AUTO: 1.11 K/UL — LOW (ref 1.2–3.4)
LYMPHOCYTES # BLD AUTO: 9.2 % — LOW (ref 20.5–51.1)
MCHC RBC-ENTMCNC: 31 PG — SIGNIFICANT CHANGE UP (ref 27–31)
MCHC RBC-ENTMCNC: 32.3 G/DL — SIGNIFICANT CHANGE UP (ref 32–37)
MCV RBC AUTO: 95.9 FL — HIGH (ref 80–94)
MONOCYTES # BLD AUTO: 0.62 K/UL — HIGH (ref 0.1–0.6)
MONOCYTES NFR BLD AUTO: 5.1 % — SIGNIFICANT CHANGE UP (ref 1.7–9.3)
NEUTROPHILS # BLD AUTO: 10.16 K/UL — HIGH (ref 1.4–6.5)
NEUTROPHILS NFR BLD AUTO: 83.7 % — HIGH (ref 42.2–75.2)
NITRITE UR-MCNC: NEGATIVE — SIGNIFICANT CHANGE UP
NRBC # BLD: 0 /100 WBCS — SIGNIFICANT CHANGE UP (ref 0–0)
PH UR: 6 — SIGNIFICANT CHANGE UP (ref 5–8)
PLATELET # BLD AUTO: 146 K/UL — SIGNIFICANT CHANGE UP (ref 130–400)
PMV BLD: 10.8 FL — HIGH (ref 7.4–10.4)
POTASSIUM SERPL-MCNC: 5.2 MMOL/L — HIGH (ref 3.5–5)
POTASSIUM SERPL-SCNC: 5.2 MMOL/L — HIGH (ref 3.5–5)
PROT SERPL-MCNC: 6.2 G/DL — SIGNIFICANT CHANGE UP (ref 6–8)
PROT UR-MCNC: SIGNIFICANT CHANGE UP MG/DL
RBC # BLD: 3.65 M/UL — LOW (ref 4.7–6.1)
RBC # FLD: 17.2 % — HIGH (ref 11.5–14.5)
SODIUM SERPL-SCNC: 134 MMOL/L — LOW (ref 135–146)
SP GR SPEC: 1.01 — SIGNIFICANT CHANGE UP (ref 1–1.03)
UROBILINOGEN FLD QL: 0.2 MG/DL — SIGNIFICANT CHANGE UP (ref 0.2–1)
WBC # BLD: 12.13 K/UL — HIGH (ref 4.8–10.8)
WBC # FLD AUTO: 12.13 K/UL — HIGH (ref 4.8–10.8)

## 2024-02-11 PROCEDURE — 76857 US EXAM PELVIC LIMITED: CPT

## 2024-02-11 PROCEDURE — 99284 EMERGENCY DEPT VISIT MOD MDM: CPT | Mod: 25

## 2024-02-11 PROCEDURE — 85025 COMPLETE CBC W/AUTO DIFF WBC: CPT

## 2024-02-11 PROCEDURE — 87086 URINE CULTURE/COLONY COUNT: CPT

## 2024-02-11 PROCEDURE — 81003 URINALYSIS AUTO W/O SCOPE: CPT

## 2024-02-11 PROCEDURE — 99284 EMERGENCY DEPT VISIT MOD MDM: CPT

## 2024-02-11 PROCEDURE — 36415 COLL VENOUS BLD VENIPUNCTURE: CPT

## 2024-02-11 PROCEDURE — 76857 US EXAM PELVIC LIMITED: CPT | Mod: 26

## 2024-02-11 PROCEDURE — 80053 COMPREHEN METABOLIC PANEL: CPT

## 2024-02-11 NOTE — ED PROVIDER NOTE - CLINICAL SUMMARY MEDICAL DECISION MAKING FREE TEXT BOX
Pt presents with feeling of not emptying urinary bladder. UA no signs of infection. US post void 15 cc. PT should follow up with urology.

## 2024-02-11 NOTE — ED PROVIDER NOTE - HIV OFFER
Advance Care Planning     Advance Care Planning Activator (Inpatient)  Conversation Note      Date of ACP Conversation: 3/9/2022     Conversation Conducted with: Patient with Decision Making Capacity    ACP Activator: Andrew Dudleychhaya, [address] [facility] Maker:     Current Designated Health Care Decision Maker:     Primary Decision Maker: Lisa Diaz Child - [phone]    Secondary Decision Maker: Anabela Palmer Cheryle Christians - Child - [phone]    Today we documented Decision Maker(s) consistent with ACP documents on file. Care Preferences    Patient is a DNR- CC( has DNR- CC in EHR, and paper DNR-CC with her)     [x] Yes   [] No   Educated Patient / Reuel Ego regarding differences between Advance Directives and portable DNR orders.     Length of ACP Conversation in minutes:  10    Conversation Outcomes:  [x] ACP discussion completed  [x] Existing advance directive reviewed with patient; no changes to patient's previously recorded wishes  [] New Advance Directive completed  [] Portable Do Not Rescitate prepared for Provider review and signature  [] POLST/POST/MOLST/MOST prepared for Provider review and signature      Follow-up plan:    [] Schedule follow-up conversation to continue planning  [] Referred individual to Provider for additional questions/concerns   [] Advised patient/agent/surrogate to review completed ACP document and update if needed with changes in condition, patient preferences or care setting    [x] This note routed to one or more involved healthcare providers Previously Declined (within the last year)

## 2024-02-11 NOTE — ED PROVIDER NOTE - NSICDXPASTMEDICALHX_GEN_ALL_CORE_FT
PAST MEDICAL HISTORY:  Afib     Bladder cancer     Congestive heart failure     COPD (chronic obstructive pulmonary disease)     High blood cholesterol     Hypertension

## 2024-02-11 NOTE — ED PROVIDER NOTE - NS_EDPROVIDERDISPOUSERTYPE_ED_A_ED
Is she needing a physical?  I am a little confused by the question.
Pt called. No answer. LMOM for patient. She needs to schedule a Physical Exam prior to any labs or screenings.
Pt is asking for an order for a biometric screening to avoid a surcharge from her insurance
Attending Attestation (For Attendings USE Only)...

## 2024-02-11 NOTE — ED PROVIDER NOTE - OBJECTIVE STATEMENT
78-year-old male history of COPD, GI bleed complaining of feeling like he is unable to fully empty his bladder for the past 3 days.  Patient denies any fever, hematuria, dysuria or abdominal pains.

## 2024-02-11 NOTE — ED PROVIDER NOTE - PATIENT PORTAL LINK FT
You can access the FollowMyHealth Patient Portal offered by [facility] by registering at the following website: [email or web address]. By joining [employer]’s FollowMyHealth portal, you will also be able to view your health information using other applications (apps) compatible with our system.

## 2024-02-11 NOTE — ED PROVIDER NOTE - ATTENDING APP SHARED VISIT CONTRIBUTION OF CARE
Pt reports he does not empty his bladder completely. NO abdominal pain. Was recently admitted for lower gi bleed, colonoscopy> diverticular bleed. No bleeding since. Chronic lower back pain. NO fever. Chronic runny nose. On exam S1S2 rrr, no murmur, lungs clear, abdomen is soft nontender, no edema lower ext. no CVA tenderness.

## 2024-02-11 NOTE — ED PROVIDER NOTE - PHYSICAL EXAMINATION
Gen: Alert, NAD, well appearing  Head: NC, AT, PERRL, EOMI, normal lids/conjunctiva  ENT: normal hearing  Neck: +supple, no tenderness/meningismus,  Pulm: Bilateral BS, normal resp effort  CV: RRR  Abd: soft, NT/ND. No flank or CVA tenderness  Mskel: no edema/erythema/cyanosis  Skin: no rash, warm/dry  Neuro: AAOx3, no sensory/motor deficits

## 2024-02-12 LAB
CULTURE RESULTS: SIGNIFICANT CHANGE UP
SPECIMEN SOURCE: SIGNIFICANT CHANGE UP

## 2024-02-13 DIAGNOSIS — Z79.82 LONG TERM (CURRENT) USE OF ASPIRIN: ICD-10-CM

## 2024-02-13 DIAGNOSIS — F41.9 ANXIETY DISORDER, UNSPECIFIED: ICD-10-CM

## 2024-02-13 DIAGNOSIS — F17.210 NICOTINE DEPENDENCE, CIGARETTES, UNCOMPLICATED: ICD-10-CM

## 2024-02-13 DIAGNOSIS — K55.21 ANGIODYSPLASIA OF COLON WITH HEMORRHAGE: ICD-10-CM

## 2024-02-13 DIAGNOSIS — J44.1 CHRONIC OBSTRUCTIVE PULMONARY DISEASE WITH (ACUTE) EXACERBATION: ICD-10-CM

## 2024-02-13 DIAGNOSIS — I50.9 HEART FAILURE, UNSPECIFIED: ICD-10-CM

## 2024-02-13 DIAGNOSIS — R79.89 OTHER SPECIFIED ABNORMAL FINDINGS OF BLOOD CHEMISTRY: ICD-10-CM

## 2024-02-13 DIAGNOSIS — Z79.52 LONG TERM (CURRENT) USE OF SYSTEMIC STEROIDS: ICD-10-CM

## 2024-02-13 DIAGNOSIS — K57.30 DIVERTICULOSIS OF LARGE INTESTINE WITHOUT PERFORATION OR ABSCESS WITHOUT BLEEDING: ICD-10-CM

## 2024-02-13 DIAGNOSIS — D62 ACUTE POSTHEMORRHAGIC ANEMIA: ICD-10-CM

## 2024-02-13 DIAGNOSIS — I48.0 PAROXYSMAL ATRIAL FIBRILLATION: ICD-10-CM

## 2024-02-13 DIAGNOSIS — Z85.51 PERSONAL HISTORY OF MALIGNANT NEOPLASM OF BLADDER: ICD-10-CM

## 2024-02-13 DIAGNOSIS — Z99.81 DEPENDENCE ON SUPPLEMENTAL OXYGEN: ICD-10-CM

## 2024-02-13 DIAGNOSIS — D50.9 IRON DEFICIENCY ANEMIA, UNSPECIFIED: ICD-10-CM

## 2024-02-13 DIAGNOSIS — K64.8 OTHER HEMORRHOIDS: ICD-10-CM

## 2024-02-13 DIAGNOSIS — I11.0 HYPERTENSIVE HEART DISEASE WITH HEART FAILURE: ICD-10-CM

## 2024-02-13 DIAGNOSIS — Z79.890 HORMONE REPLACEMENT THERAPY: ICD-10-CM

## 2024-02-20 ENCOUNTER — APPOINTMENT (OUTPATIENT)
Dept: CARDIOLOGY | Facility: CLINIC | Age: 79
End: 2024-02-20

## 2024-03-13 NOTE — ED ADULT NURSE NOTE - CHIEF COMPLAINT
The patient is a 73y Male complaining of shortness of breath. The above plan has been reviewed with the surgeon

## 2024-03-20 ENCOUNTER — APPOINTMENT (OUTPATIENT)
Dept: HEMATOLOGY ONCOLOGY | Facility: CLINIC | Age: 79
End: 2024-03-20

## 2024-03-20 ENCOUNTER — OUTPATIENT (OUTPATIENT)
Dept: OUTPATIENT SERVICES | Facility: HOSPITAL | Age: 79
LOS: 1 days | End: 2024-03-20
Payer: MEDICARE

## 2024-03-20 DIAGNOSIS — I73.9 PERIPHERAL VASCULAR DISEASE, UNSPECIFIED: ICD-10-CM

## 2024-03-20 LAB
BASOPHILS # BLD AUTO: 0.1 K/UL
BASOPHILS NFR BLD AUTO: 0.9 %
EOSINOPHIL # BLD AUTO: 0.29 K/UL
EOSINOPHIL NFR BLD AUTO: 2.5 %
HCT VFR BLD CALC: 43.1 %
HGB BLD-MCNC: 13.5 G/DL
IMM GRANULOCYTES NFR BLD AUTO: 0.4 %
LYMPHOCYTES # BLD AUTO: 3.47 K/UL
LYMPHOCYTES NFR BLD AUTO: 30.3 %
MAN DIFF?: NORMAL
MCHC RBC-ENTMCNC: 30.3 PG
MCHC RBC-ENTMCNC: 31.3 G/DL
MCV RBC AUTO: 96.6 FL
MONOCYTES # BLD AUTO: 1.16 K/UL
MONOCYTES NFR BLD AUTO: 10.1 %
NEUTROPHILS # BLD AUTO: 6.38 K/UL
NEUTROPHILS NFR BLD AUTO: 55.8 %
PLATELET # BLD AUTO: 200 K/UL
PMV BLD AUTO: 0 /100 WBCS
RBC # BLD: 4.46 M/UL
RBC # FLD: 14.6 %
WBC # FLD AUTO: 11.45 K/UL

## 2024-03-20 PROCEDURE — 85027 COMPLETE CBC AUTOMATED: CPT

## 2024-03-20 PROCEDURE — 36415 COLL VENOUS BLD VENIPUNCTURE: CPT

## 2024-03-20 PROCEDURE — 82728 ASSAY OF FERRITIN: CPT

## 2024-03-21 DIAGNOSIS — I73.9 PERIPHERAL VASCULAR DISEASE, UNSPECIFIED: ICD-10-CM

## 2024-03-21 LAB — FERRITIN SERPL-MCNC: 151 NG/ML

## 2024-03-26 ENCOUNTER — APPOINTMENT (OUTPATIENT)
Dept: PULMONOLOGY | Facility: CLINIC | Age: 79
End: 2024-03-26
Payer: MEDICARE

## 2024-03-26 ENCOUNTER — APPOINTMENT (OUTPATIENT)
Dept: CARDIOLOGY | Facility: CLINIC | Age: 79
End: 2024-03-26
Payer: MEDICARE

## 2024-03-26 VITALS
DIASTOLIC BLOOD PRESSURE: 64 MMHG | HEIGHT: 66 IN | OXYGEN SATURATION: 94 % | HEART RATE: 46 BPM | SYSTOLIC BLOOD PRESSURE: 130 MMHG | WEIGHT: 151 LBS | BODY MASS INDEX: 24.27 KG/M2

## 2024-03-26 VITALS
BODY MASS INDEX: 25.39 KG/M2 | SYSTOLIC BLOOD PRESSURE: 124 MMHG | WEIGHT: 158 LBS | HEART RATE: 49 BPM | OXYGEN SATURATION: 95 % | DIASTOLIC BLOOD PRESSURE: 62 MMHG | HEIGHT: 66 IN

## 2024-03-26 DIAGNOSIS — Z79.899 OTHER LONG TERM (CURRENT) DRUG THERAPY: ICD-10-CM

## 2024-03-26 DIAGNOSIS — I25.10 ATHEROSCLEROTIC HEART DISEASE OF NATIVE CORONARY ARTERY W/OUT ANGINA PECTORIS: ICD-10-CM

## 2024-03-26 DIAGNOSIS — I73.9 PERIPHERAL VASCULAR DISEASE, UNSPECIFIED: ICD-10-CM

## 2024-03-26 DIAGNOSIS — J44.9 CHRONIC OBSTRUCTIVE PULMONARY DISEASE, UNSPECIFIED: ICD-10-CM

## 2024-03-26 DIAGNOSIS — R09.02 HYPOXEMIA: ICD-10-CM

## 2024-03-26 DIAGNOSIS — C67.9 MALIGNANT NEOPLASM OF BLADDER, UNSPECIFIED: ICD-10-CM

## 2024-03-26 DIAGNOSIS — J43.9 EMPHYSEMA, UNSPECIFIED: ICD-10-CM

## 2024-03-26 DIAGNOSIS — R00.1 BRADYCARDIA, UNSPECIFIED: ICD-10-CM

## 2024-03-26 PROCEDURE — 99213 OFFICE O/P EST LOW 20 MIN: CPT

## 2024-03-26 PROCEDURE — G2211 COMPLEX E/M VISIT ADD ON: CPT

## 2024-03-26 RX ORDER — CLOPIDOGREL BISULFATE 75 MG/1
75 TABLET, FILM COATED ORAL DAILY
Qty: 90 | Refills: 3 | Status: DISCONTINUED | COMMUNITY
Start: 2022-11-07 | End: 2024-03-26

## 2024-03-26 RX ORDER — PREDNISONE 10 MG/1
10 TABLET ORAL
Qty: 100 | Refills: 0 | Status: ACTIVE | COMMUNITY
Start: 2022-09-08 | End: 1900-01-01

## 2024-03-26 NOTE — PHYSICAL EXAM
[No Acute Distress] : no acute distress [Normal Oropharynx] : normal oropharynx [Normal Appearance] : normal appearance [No Neck Mass] : no neck mass [Normal Rate/Rhythm] : normal rate/rhythm [Normal S1, S2] : normal s1, s2 [No Murmurs] : no murmurs [No Resp Distress] : no resp distress [No Abnormalities] : no abnormalities [Benign] : benign [Normal Gait] : normal gait [No Cyanosis] : no cyanosis [No Clubbing] : no clubbing [No Edema] : no edema [FROM] : FROM [Normal Color/ Pigmentation] : normal color/ pigmentation [No Focal Deficits] : no focal deficits [Oriented x3] : oriented x3 [Normal Affect] : normal affect [TextBox_68] : Decreased breath sounds

## 2024-03-26 NOTE — DISCUSSION/SUMMARY
[FreeTextEntry1] : Patient lost 7 llb.  Bladder cancer . He sees Dr Jose,He is suppose  use o2 24 hours, He does not always use.  On amiodarone five days /week. week.  He has bladder ca, .Now see Dr Hayes.  He got chemo at [facility] He had afib converted with amiodarone Now on po amiodarone. CXR by Dr Jose. Cysto every 6 mos. Opth to see 4/24. . .Last dobutamine  se 4/18 neg. He had shoulder pain. But it is not cardiac. Reviewed stop smoking . He had covid 12/21. Told need stop smoking. He 2/24 diverticular bleed. He got iron infusions.Will stop Plavyx .. Continue asa.

## 2024-03-26 NOTE — HISTORY OF PRESENT ILLNESS
[FreeTextEntry1] : The patient use o2 at home. He is sob. He gets THAPA. stable.. Still at times smokes.  He has pvd. But limited by THAPA. He had covid 12/21. Rare   palpitations He 2/24 diverticular bleed. He got iron infusion.

## 2024-03-26 NOTE — REVIEW OF SYSTEMS
[Fever] : no fever [Chills] : no chills [Blurry Vision] : no blurred vision [Hearing Loss] : hearing loss [Dyspnea on exertion] : dyspnea during exertion [Wheezing] : wheezing [Cough] : cough [Abdominal Pain] : no abdominal pain [Joint Pain] : no joint pain [Rash] : no rash [Dizziness] : no dizziness [Confusion] : no confusion was observed [Easy Bleeding] : no tendency for easy bleeding

## 2024-03-26 NOTE — REASON FOR VISIT
[Follow-Up] : a follow-up visit [COPD] : COPD [TextBox_44] : Patient has a history of severe COPD.  Had COVID in January as well as admission for diverticulitis.  He is feeling better.  He is able to get around.  He is taking his medications as tolerated.  Today his heart rate was a little slow.  He is seeing a cardiologist this afternoon.  Possibly the need to turn down the Toprol.

## 2024-03-26 NOTE — PHYSICAL EXAM
[Well Developed] : well developed [Well Nourished] : well nourished [No Acute Distress] : no acute distress [Normal Venous Pressure] : normal venous pressure [Normal Conjunctiva] : normal conjunctiva [Normal Rate] : the respiratory rate was normal [Normal S1, S2] : normal S1, S2 [No Murmur] : no murmur [Decreased Breath Sounds] : breath sounds were decreased diffusely [Decreased Breath Sounds] : breath sounds were not diminished [No Edema] : no edema [Normal Gait] : normal gait [de-identified] : varicose veins legs [Alert and Oriented] : alert and oriented

## 2024-03-26 NOTE — ASSESSMENT
[FreeTextEntry1] : Stress compliance with inhalers PRN albuterol Nebulizer He likes to have prednisone in the house for an emergency Continue Henok stressed importance needs CT but refusing pt slowly declining F/U 6 months  I reviewed the entire case with the patient's Daughter who was in attendance today.

## 2024-03-27 RX ORDER — ALPRAZOLAM 0.5 MG/1
0.5 TABLET ORAL
Qty: 90 | Refills: 0 | Status: ACTIVE | COMMUNITY
Start: 1900-01-01 | End: 1900-01-01

## 2024-03-28 ENCOUNTER — APPOINTMENT (OUTPATIENT)
Dept: CARDIOLOGY | Facility: CLINIC | Age: 79
End: 2024-03-28

## 2024-04-11 ENCOUNTER — APPOINTMENT (OUTPATIENT)
Age: 79
End: 2024-04-11
Payer: MEDICARE

## 2024-04-11 ENCOUNTER — OUTPATIENT (OUTPATIENT)
Dept: OUTPATIENT SERVICES | Facility: HOSPITAL | Age: 79
LOS: 1 days | End: 2024-04-11
Payer: MEDICARE

## 2024-04-11 ENCOUNTER — LABORATORY RESULT (OUTPATIENT)
Age: 79
End: 2024-04-11

## 2024-04-11 VITALS
WEIGHT: 150.5 LBS | HEIGHT: 66 IN | SYSTOLIC BLOOD PRESSURE: 122 MMHG | RESPIRATION RATE: 16 BRPM | DIASTOLIC BLOOD PRESSURE: 57 MMHG | TEMPERATURE: 97.9 F | OXYGEN SATURATION: 94 % | HEART RATE: 55 BPM | BODY MASS INDEX: 24.19 KG/M2

## 2024-04-11 DIAGNOSIS — E61.1 IRON DEFICIENCY: ICD-10-CM

## 2024-04-11 PROCEDURE — 85027 COMPLETE CBC AUTOMATED: CPT

## 2024-04-11 PROCEDURE — 82728 ASSAY OF FERRITIN: CPT

## 2024-04-11 PROCEDURE — 99213 OFFICE O/P EST LOW 20 MIN: CPT

## 2024-04-12 DIAGNOSIS — E61.1 IRON DEFICIENCY: ICD-10-CM

## 2024-04-12 LAB
FERRITIN SERPL-MCNC: 135 NG/ML
HCT VFR BLD CALC: 42.6 %
HGB BLD-MCNC: 13.8 G/DL
MCHC RBC-ENTMCNC: 29.4 PG
MCHC RBC-ENTMCNC: 32.4 G/DL
MCV RBC AUTO: 90.8 FL
PLATELET # BLD AUTO: 203 K/UL
PMV BLD: 10.4 FL
RBC # BLD: 4.69 M/UL
RBC # FLD: 14.1 %
WBC # FLD AUTO: 10.03 K/UL

## 2024-04-14 PROBLEM — E61.1 IRON DEFICIENCY: Status: ACTIVE | Noted: 2023-04-10

## 2024-04-14 NOTE — ASSESSMENT
[FreeTextEntry1] : -77 year old male with iron deficiency anemia ,S /P venofer. normal Hgb and ferritin , -History of GI bleeding , resolved . currently on aspirin only .   -Hypothyroidism on replacement ,    -PAD                            Plan ; repeat cbc , ferritin every 3 months , return in 6 months .             follow up with cardiology , vascular ?

## 2024-04-14 NOTE — HISTORY OF PRESENT ILLNESS
[de-identified] : \par  THOMAS COPPOLA is 77 year old male , referred from CORDELIA Perez for Low HGB and Ferritin level.\par  Patient reports he experience SOB on exertion, decreased exercise tolerance, fatigue. NO CP.\par  He denied rectal bleeding , he didn't have Colonoscopy from 15 yeas ago. \par  Past medical History of : Bladder Cancer get treated by DR. Finley in [facility] [place].\par   Small Cell Carcinoma of the skin, treated by Dr. CHRIS Gutierrez in [place]. \par   He has COPD on Home O2 intermittently , And Nebulizer, currently he is still smoking. \par   He has Coronary Artery Disease and A FIB on Aspirin and Plavix. \par  \par  His daughter states that he looks more short of breath when he does any activity. He has oxygen at home.as he desaturates during exercise but does not like to use it. \par   \par   [de-identified] : 5/16/2023 Patient returns after venofer . He continues to complain of fatigue , feels wiped out at end of the day . no hematochezia.

## 2024-05-21 ENCOUNTER — APPOINTMENT (OUTPATIENT)
Dept: VASCULAR SURGERY | Facility: CLINIC | Age: 79
End: 2024-05-21
Payer: MEDICARE

## 2024-05-21 VITALS
WEIGHT: 156 LBS | SYSTOLIC BLOOD PRESSURE: 131 MMHG | DIASTOLIC BLOOD PRESSURE: 73 MMHG | BODY MASS INDEX: 25.07 KG/M2 | HEIGHT: 66 IN

## 2024-05-21 DIAGNOSIS — I70.213 ATHEROSCLEROSIS OF NATIVE ARTERIES OF EXTREMITIES WITH INTERMITTENT CLAUDICATION, BILATERAL LEGS: ICD-10-CM

## 2024-05-21 PROCEDURE — 99203 OFFICE O/P NEW LOW 30 MIN: CPT

## 2024-05-21 PROCEDURE — 93925 LOWER EXTREMITY STUDY: CPT

## 2024-05-21 NOTE — ASSESSMENT
[FreeTextEntry1] : The patient is an 79-year-old male with peripheral vascular disease however he remains asymptomatic at this time.  The patient has no evidence of tissue loss or rest pain present.  The patient claudication symptoms are not lifestyle limiting because he is limited by his COPD and exertional dyspnea.  At this point no vascular surgery intervention is warranted the patient has significant right leg PVD however remains asymptomatic.  I would like to see him back in my office in 1 year time or sooner if he develops any tissue loss or rest pain.  I, Dr. Victoriano Etienne, personally performed the evaluation and management (E/M) services for this new patient. That E/M includes conducting the clinically appropriate initial history &/or exam, assessing all conditions, and establishing the plan of care. Today, my ANEESH, Yajaira Pineda PA-C, was here to observe my evaluation and management service for this patient & follow plan of care established by me going forward.

## 2024-05-21 NOTE — HISTORY OF PRESENT ILLNESS
[FreeTextEntry1] : The patient is an 79-year-old male with a significant past medical history for atherosclerosis of his bilateral lower extremities coronary artery disease bladder cancer skin cancer, COPD and venous insufficiency status post EVLT of the saphenous vein.  The patient was seen by his insurance company and had an ankle-brachial index that showed an JAMIE on the right 1.49 on the left 0.89.  The patient was sent here for evaluation for peripheral artery disease.  He denies lifestyle limiting claudication symptoms specifically he states he is limited by his shortness of breath prior to leg pain.

## 2024-05-21 NOTE — DATA REVIEWED
[FreeTextEntry1] : Arterial duplex right lower extremity significant right superficial femoral artery occlusive disease.  Left lower extremity diffuse atherosclerotic plaque however no evidence of focal stenosis present to his common femoral, superficial femoral, popliteal artery

## 2024-07-08 ENCOUNTER — OUTPATIENT (OUTPATIENT)
Dept: OUTPATIENT SERVICES | Facility: HOSPITAL | Age: 79
LOS: 1 days | End: 2024-07-08
Payer: MEDICARE

## 2024-07-08 ENCOUNTER — APPOINTMENT (OUTPATIENT)
Age: 79
End: 2024-07-08

## 2024-07-08 ENCOUNTER — LABORATORY RESULT (OUTPATIENT)
Age: 79
End: 2024-07-08

## 2024-07-08 DIAGNOSIS — E61.1 IRON DEFICIENCY: ICD-10-CM

## 2024-07-08 LAB
HCT VFR BLD CALC: 42 %
HGB BLD-MCNC: 13.5 G/DL
MCHC RBC-ENTMCNC: 30.3 PG
MCHC RBC-ENTMCNC: 32.1 G/DL
MCV RBC AUTO: 94.2 FL
PLATELET # BLD AUTO: 180 K/UL
PMV BLD: 10.2 FL
RBC # BLD: 4.46 M/UL
RBC # FLD: 14 %
WBC # FLD AUTO: 8.9 K/UL

## 2024-07-08 PROCEDURE — 85027 COMPLETE CBC AUTOMATED: CPT

## 2024-07-08 PROCEDURE — 82728 ASSAY OF FERRITIN: CPT

## 2024-07-08 PROCEDURE — 36415 COLL VENOUS BLD VENIPUNCTURE: CPT

## 2024-07-09 DIAGNOSIS — E61.1 IRON DEFICIENCY: ICD-10-CM

## 2024-07-09 LAB — FERRITIN SERPL-MCNC: 148 NG/ML

## 2024-08-14 ENCOUNTER — OUTPATIENT (OUTPATIENT)
Dept: OUTPATIENT SERVICES | Facility: HOSPITAL | Age: 79
LOS: 1 days | End: 2024-08-14
Payer: MEDICARE

## 2024-08-14 ENCOUNTER — APPOINTMENT (OUTPATIENT)
Age: 79
End: 2024-08-14

## 2024-08-14 ENCOUNTER — LABORATORY RESULT (OUTPATIENT)
Age: 79
End: 2024-08-14

## 2024-08-14 DIAGNOSIS — E61.1 IRON DEFICIENCY: ICD-10-CM

## 2024-08-14 LAB
HCT VFR BLD CALC: 40.3 %
HGB BLD-MCNC: 13.2 G/DL
MCHC RBC-ENTMCNC: 30.8 PG
MCHC RBC-ENTMCNC: 32.8 G/DL
MCV RBC AUTO: 94.2 FL
PLATELET # BLD AUTO: 183 K/UL
PMV BLD: 10.2 FL
RBC # BLD: 4.28 M/UL
RBC # FLD: 13.3 %
WBC # FLD AUTO: 8.38 K/UL

## 2024-08-14 PROCEDURE — 83615 LACTATE (LD) (LDH) ENZYME: CPT

## 2024-08-14 PROCEDURE — 82607 VITAMIN B-12: CPT

## 2024-08-14 PROCEDURE — 36415 COLL VENOUS BLD VENIPUNCTURE: CPT

## 2024-08-14 PROCEDURE — 85027 COMPLETE CBC AUTOMATED: CPT

## 2024-08-14 PROCEDURE — 83550 IRON BINDING TEST: CPT

## 2024-08-14 PROCEDURE — 83540 ASSAY OF IRON: CPT

## 2024-08-14 PROCEDURE — 83921 ORGANIC ACID SINGLE QUANT: CPT

## 2024-08-14 PROCEDURE — 82728 ASSAY OF FERRITIN: CPT

## 2024-08-15 DIAGNOSIS — E61.1 IRON DEFICIENCY: ICD-10-CM

## 2024-08-15 LAB
FERRITIN SERPL-MCNC: 195 NG/ML
IRON SATN MFR SERPL: 34 %
IRON SERPL-MCNC: 86 UG/DL
IRON SERPL-MCNC: 86 UG/DL
LDH SERPL-CCNC: 176 U/L
TIBC SERPL-MCNC: 256 UG/DL
UIBC SERPL-MCNC: 170 UG/DL
VIT B12 SERPL-MCNC: 476 PG/ML

## 2024-08-20 LAB — METHYLMALONATE SERPL-SCNC: 302 NMOL/L

## 2024-10-05 ENCOUNTER — RX RENEWAL (OUTPATIENT)
Age: 79
End: 2024-10-05

## 2024-10-15 ENCOUNTER — APPOINTMENT (OUTPATIENT)
Age: 79
End: 2024-10-15

## 2024-10-15 ENCOUNTER — LABORATORY RESULT (OUTPATIENT)
Age: 79
End: 2024-10-15

## 2024-10-15 ENCOUNTER — OUTPATIENT (OUTPATIENT)
Dept: OUTPATIENT SERVICES | Facility: HOSPITAL | Age: 79
LOS: 1 days | End: 2024-10-15
Payer: MEDICARE

## 2024-10-15 DIAGNOSIS — E61.1 IRON DEFICIENCY: ICD-10-CM

## 2024-10-15 LAB
HCT VFR BLD CALC: 43.2 %
HGB BLD-MCNC: 13.8 G/DL
MCHC RBC-ENTMCNC: 30.7 PG
MCHC RBC-ENTMCNC: 31.9 G/DL
MCV RBC AUTO: 96 FL
PLATELET # BLD AUTO: 192 K/UL
PMV BLD: 9.7 FL
RBC # BLD: 4.5 M/UL
RBC # FLD: 13 %
WBC # FLD AUTO: 12.09 K/UL

## 2024-10-15 PROCEDURE — 36415 COLL VENOUS BLD VENIPUNCTURE: CPT

## 2024-10-15 PROCEDURE — 82728 ASSAY OF FERRITIN: CPT

## 2024-10-15 PROCEDURE — 85027 COMPLETE CBC AUTOMATED: CPT

## 2024-10-16 DIAGNOSIS — E61.1 IRON DEFICIENCY: ICD-10-CM

## 2024-10-16 LAB — FERRITIN SERPL-MCNC: 139 NG/ML

## 2024-10-21 ENCOUNTER — OUTPATIENT (OUTPATIENT)
Dept: OUTPATIENT SERVICES | Facility: HOSPITAL | Age: 79
LOS: 1 days | End: 2024-10-21
Payer: MEDICARE

## 2024-10-21 ENCOUNTER — APPOINTMENT (OUTPATIENT)
Age: 79
End: 2024-10-21
Payer: MEDICARE

## 2024-10-21 ENCOUNTER — LABORATORY RESULT (OUTPATIENT)
Age: 79
End: 2024-10-21

## 2024-10-21 VITALS
OXYGEN SATURATION: 90 % | HEIGHT: 66 IN | WEIGHT: 153 LBS | BODY MASS INDEX: 24.59 KG/M2 | DIASTOLIC BLOOD PRESSURE: 63 MMHG | HEART RATE: 47 BPM | TEMPERATURE: 97.8 F | SYSTOLIC BLOOD PRESSURE: 129 MMHG | RESPIRATION RATE: 16 BRPM

## 2024-10-21 DIAGNOSIS — E61.1 IRON DEFICIENCY: ICD-10-CM

## 2024-10-21 LAB
HCT VFR BLD CALC: 43.6 %
HGB BLD-MCNC: 14.5 G/DL
MCHC RBC-ENTMCNC: 31.3 PG
MCHC RBC-ENTMCNC: 33.3 G/DL
MCV RBC AUTO: 94 FL
PLATELET # BLD AUTO: 168 K/UL
PMV BLD: 11.5 FL
RBC # BLD: 4.64 M/UL
RBC # FLD: 13 %
WBC # FLD AUTO: 13.89 K/UL

## 2024-10-21 PROCEDURE — 99213 OFFICE O/P EST LOW 20 MIN: CPT

## 2024-10-21 PROCEDURE — 85027 COMPLETE CBC AUTOMATED: CPT

## 2024-10-22 ENCOUNTER — APPOINTMENT (OUTPATIENT)
Dept: CARDIOLOGY | Facility: CLINIC | Age: 79
End: 2024-10-22
Payer: MEDICARE

## 2024-10-22 ENCOUNTER — APPOINTMENT (OUTPATIENT)
Dept: PULMONOLOGY | Facility: CLINIC | Age: 79
End: 2024-10-22
Payer: MEDICARE

## 2024-10-22 VITALS
HEIGHT: 66 IN | BODY MASS INDEX: 24.75 KG/M2 | DIASTOLIC BLOOD PRESSURE: 60 MMHG | HEART RATE: 47 BPM | OXYGEN SATURATION: 90 % | SYSTOLIC BLOOD PRESSURE: 124 MMHG | WEIGHT: 154 LBS

## 2024-10-22 DIAGNOSIS — I73.9 PERIPHERAL VASCULAR DISEASE, UNSPECIFIED: ICD-10-CM

## 2024-10-22 DIAGNOSIS — J44.9 CHRONIC OBSTRUCTIVE PULMONARY DISEASE, UNSPECIFIED: ICD-10-CM

## 2024-10-22 DIAGNOSIS — R09.02 HYPOXEMIA: ICD-10-CM

## 2024-10-22 DIAGNOSIS — Z79.899 OTHER LONG TERM (CURRENT) DRUG THERAPY: ICD-10-CM

## 2024-10-22 DIAGNOSIS — I25.10 ATHEROSCLEROTIC HEART DISEASE OF NATIVE CORONARY ARTERY W/OUT ANGINA PECTORIS: ICD-10-CM

## 2024-10-22 DIAGNOSIS — J43.9 EMPHYSEMA, UNSPECIFIED: ICD-10-CM

## 2024-10-22 DIAGNOSIS — R00.1 BRADYCARDIA, UNSPECIFIED: ICD-10-CM

## 2024-10-22 DIAGNOSIS — Z95.1 PRESENCE OF AORTOCORONARY BYPASS GRAFT: ICD-10-CM

## 2024-10-22 PROCEDURE — G2211 COMPLEX E/M VISIT ADD ON: CPT

## 2024-10-22 PROCEDURE — 99213 OFFICE O/P EST LOW 20 MIN: CPT

## 2024-10-22 RX ORDER — AZITHROMYCIN 250 MG/1
250 TABLET, FILM COATED ORAL DAILY
Qty: 30 | Refills: 5 | Status: ACTIVE | COMMUNITY
Start: 2024-10-22 | End: 1900-01-01

## 2024-10-23 ENCOUNTER — RX CHANGE (OUTPATIENT)
Age: 79
End: 2024-10-23

## 2024-10-23 RX ORDER — UMECLIDINIUM 62.5 UG/1
62.5 AEROSOL, POWDER ORAL DAILY
Qty: 3 | Refills: 3 | Status: ACTIVE | COMMUNITY
Start: 1900-01-01 | End: 1900-01-01

## 2024-10-23 RX ORDER — TIOTROPIUM BROMIDE 18 UG/1
18 CAPSULE ORAL; RESPIRATORY (INHALATION) DAILY
Qty: 30 | Refills: 5 | Status: DISCONTINUED | COMMUNITY
Start: 2024-10-22 | End: 2024-10-23

## 2024-11-06 ENCOUNTER — APPOINTMENT (OUTPATIENT)
Dept: CARDIOLOGY | Facility: CLINIC | Age: 79
End: 2024-11-06
Payer: MEDICARE

## 2024-11-06 PROCEDURE — 93000 ELECTROCARDIOGRAM COMPLETE: CPT

## 2024-11-06 PROCEDURE — 99211 OFF/OP EST MAY X REQ PHY/QHP: CPT | Mod: 25

## 2024-11-13 ENCOUNTER — APPOINTMENT (OUTPATIENT)
Dept: CARDIOLOGY | Facility: CLINIC | Age: 79
End: 2024-11-13
Payer: MEDICARE

## 2024-11-13 PROCEDURE — 93000 ELECTROCARDIOGRAM COMPLETE: CPT

## 2024-11-20 ENCOUNTER — RESULT REVIEW (OUTPATIENT)
Age: 79
End: 2024-11-20

## 2024-11-20 ENCOUNTER — OUTPATIENT (OUTPATIENT)
Dept: OUTPATIENT SERVICES | Facility: HOSPITAL | Age: 79
LOS: 1 days | End: 2024-11-20
Payer: MEDICARE

## 2024-11-20 ENCOUNTER — APPOINTMENT (OUTPATIENT)
Dept: CV DIAGNOSITCS | Facility: HOSPITAL | Age: 79
End: 2024-11-20
Payer: MEDICARE

## 2024-11-20 DIAGNOSIS — I10 ESSENTIAL (PRIMARY) HYPERTENSION: ICD-10-CM

## 2024-11-20 PROCEDURE — 93307 TTE W/O DOPPLER COMPLETE: CPT

## 2024-11-20 PROCEDURE — 93307 TTE W/O DOPPLER COMPLETE: CPT | Mod: 26

## 2024-11-21 DIAGNOSIS — I10 ESSENTIAL (PRIMARY) HYPERTENSION: ICD-10-CM

## 2024-12-09 RX ORDER — FLUTICASONE PROPIONATE AND SALMETEROL 250; 50 UG/1; UG/1
250-50 POWDER RESPIRATORY (INHALATION)
Qty: 3 | Refills: 3 | Status: ACTIVE | COMMUNITY
Start: 2024-12-09 | End: 1900-01-01

## 2025-01-02 ENCOUNTER — RX RENEWAL (OUTPATIENT)
Age: 80
End: 2025-01-02

## 2025-01-06 ENCOUNTER — RX RENEWAL (OUTPATIENT)
Age: 80
End: 2025-01-06

## 2025-01-13 ENCOUNTER — APPOINTMENT (OUTPATIENT)
Age: 80
End: 2025-01-13

## 2025-02-11 ENCOUNTER — APPOINTMENT (OUTPATIENT)
Dept: PAIN MANAGEMENT | Facility: CLINIC | Age: 80
End: 2025-02-11

## 2025-03-12 ENCOUNTER — RX RENEWAL (OUTPATIENT)
Age: 80
End: 2025-03-12

## 2025-04-07 ENCOUNTER — APPOINTMENT (OUTPATIENT)
Age: 80
End: 2025-04-07

## 2025-04-14 ENCOUNTER — OUTPATIENT (OUTPATIENT)
Dept: OUTPATIENT SERVICES | Facility: HOSPITAL | Age: 80
LOS: 1 days | End: 2025-04-14
Payer: MEDICARE

## 2025-04-14 ENCOUNTER — APPOINTMENT (OUTPATIENT)
Age: 80
End: 2025-04-14
Payer: MEDICARE

## 2025-04-14 VITALS
SYSTOLIC BLOOD PRESSURE: 144 MMHG | TEMPERATURE: 98.4 F | OXYGEN SATURATION: 94 % | HEART RATE: 53 BPM | HEIGHT: 66 IN | BODY MASS INDEX: 25.55 KG/M2 | WEIGHT: 159 LBS | DIASTOLIC BLOOD PRESSURE: 62 MMHG | RESPIRATION RATE: 16 BRPM

## 2025-04-14 DIAGNOSIS — E61.1 IRON DEFICIENCY: ICD-10-CM

## 2025-04-14 LAB
AUTO BASOPHILS #: 0.1 K/UL
AUTO BASOPHILS %: 0.9 %
AUTO EOSINOPHILS #: 0.53 K/UL
AUTO EOSINOPHILS %: 4.9 %
AUTO IMMATURE GRANULOCYTES #: 0.04 K/UL
AUTO LYMPHOCYTES #: 3.13 K/UL
AUTO LYMPHOCYTES %: 28.7 %
AUTO MONOCYTES #: 1.15 K/UL
AUTO MONOCYTES %: 10.6 %
AUTO NEUTROPHILS #: 5.95 K/UL
AUTO NEUTROPHILS %: 54.5 %
AUTO NRBC #: 0 K/UL
HCT VFR BLD CALC: 41.2 %
HGB BLD-MCNC: 13.3 G/DL
IMM GRANULOCYTES NFR BLD AUTO: 0.4 %
MAN DIFF?: NORMAL
MCHC RBC-ENTMCNC: 31.1 PG
MCHC RBC-ENTMCNC: 32.3 G/DL
MCV RBC AUTO: 96.3 FL
PLATELET # BLD AUTO: 191 K/UL
PMV BLD AUTO: 0 /100 WBCS
PMV BLD: 10.4 FL
RBC # BLD: 4.28 M/UL
RBC # FLD: 13.1 %
WBC # FLD AUTO: 10.9 K/UL

## 2025-04-14 PROCEDURE — 99213 OFFICE O/P EST LOW 20 MIN: CPT

## 2025-04-14 PROCEDURE — 82728 ASSAY OF FERRITIN: CPT

## 2025-04-14 PROCEDURE — 85025 COMPLETE CBC W/AUTO DIFF WBC: CPT

## 2025-04-15 DIAGNOSIS — E61.1 IRON DEFICIENCY: ICD-10-CM

## 2025-04-16 LAB — FERRITIN SERPL-MCNC: 124 NG/ML

## 2025-05-07 ENCOUNTER — APPOINTMENT (OUTPATIENT)
Dept: PULMONOLOGY | Facility: CLINIC | Age: 80
End: 2025-05-07
Payer: MEDICARE

## 2025-05-07 PROCEDURE — 99214 OFFICE O/P EST MOD 30 MIN: CPT

## 2025-05-07 PROCEDURE — G2211 COMPLEX E/M VISIT ADD ON: CPT

## 2025-05-07 RX ORDER — PREDNISONE 10 MG/1
10 TABLET ORAL
Qty: 60 | Refills: 2 | Status: ACTIVE | COMMUNITY
Start: 2025-05-07 | End: 1900-01-01

## 2025-05-09 ENCOUNTER — APPOINTMENT (OUTPATIENT)
Dept: CARDIOLOGY | Facility: CLINIC | Age: 80
End: 2025-05-09
Payer: MEDICARE

## 2025-05-09 VITALS
OXYGEN SATURATION: 93 % | HEIGHT: 66 IN | SYSTOLIC BLOOD PRESSURE: 154 MMHG | DIASTOLIC BLOOD PRESSURE: 73 MMHG | WEIGHT: 168 LBS | HEART RATE: 71 BPM | BODY MASS INDEX: 27 KG/M2

## 2025-05-09 DIAGNOSIS — I70.213 ATHEROSCLEROSIS OF NATIVE ARTERIES OF EXTREMITIES WITH INTERMITTENT CLAUDICATION, BILATERAL LEGS: ICD-10-CM

## 2025-05-09 DIAGNOSIS — R09.02 HYPOXEMIA: ICD-10-CM

## 2025-05-09 DIAGNOSIS — Z99.81 DEPENDENCE ON SUPPLEMENTAL OXYGEN: ICD-10-CM

## 2025-05-09 DIAGNOSIS — J44.1 CHRONIC OBSTRUCTIVE PULMONARY DISEASE WITH (ACUTE) EXACERBATION: ICD-10-CM

## 2025-05-09 DIAGNOSIS — J44.9 CHRONIC OBSTRUCTIVE PULMONARY DISEASE, UNSPECIFIED: ICD-10-CM

## 2025-05-09 DIAGNOSIS — Z95.1 PRESENCE OF AORTOCORONARY BYPASS GRAFT: ICD-10-CM

## 2025-05-09 DIAGNOSIS — I25.10 ATHEROSCLEROTIC HEART DISEASE OF NATIVE CORONARY ARTERY W/OUT ANGINA PECTORIS: ICD-10-CM

## 2025-05-09 DIAGNOSIS — J43.9 CHRONIC OBSTRUCTIVE PULMONARY DISEASE WITH (ACUTE) EXACERBATION: ICD-10-CM

## 2025-05-09 PROCEDURE — 99214 OFFICE O/P EST MOD 30 MIN: CPT

## 2025-05-09 PROCEDURE — G2211 COMPLEX E/M VISIT ADD ON: CPT

## 2025-05-20 ENCOUNTER — APPOINTMENT (OUTPATIENT)
Dept: VASCULAR SURGERY | Facility: CLINIC | Age: 80
End: 2025-05-20

## 2025-06-20 ENCOUNTER — RX RENEWAL (OUTPATIENT)
Age: 80
End: 2025-06-20

## 2025-08-01 ENCOUNTER — RX RENEWAL (OUTPATIENT)
Age: 80
End: 2025-08-01

## 2025-09-08 ENCOUNTER — RX RENEWAL (OUTPATIENT)
Age: 80
End: 2025-09-08

## 2025-09-10 ENCOUNTER — APPOINTMENT (OUTPATIENT)
Dept: CARDIOLOGY | Facility: CLINIC | Age: 80
End: 2025-09-10